# Patient Record
Sex: FEMALE | Race: WHITE | NOT HISPANIC OR LATINO | Employment: OTHER | URBAN - METROPOLITAN AREA
[De-identification: names, ages, dates, MRNs, and addresses within clinical notes are randomized per-mention and may not be internally consistent; named-entity substitution may affect disease eponyms.]

---

## 2017-01-01 ENCOUNTER — HOSPITAL ENCOUNTER (INPATIENT)
Facility: OTHER | Age: 82
LOS: 5 days | DRG: 291 | End: 2017-01-20
Attending: EMERGENCY MEDICINE | Admitting: EMERGENCY MEDICINE
Payer: MEDICARE

## 2017-01-01 VITALS
BODY MASS INDEX: 22.08 KG/M2 | WEIGHT: 120 LBS | HEIGHT: 62 IN | RESPIRATION RATE: 18 BRPM | SYSTOLIC BLOOD PRESSURE: 106 MMHG | TEMPERATURE: 98 F | DIASTOLIC BLOOD PRESSURE: 53 MMHG | HEART RATE: 64 BPM | OXYGEN SATURATION: 98 %

## 2017-01-01 DIAGNOSIS — R06.02 SHORTNESS OF BREATH: Primary | ICD-10-CM

## 2017-01-01 DIAGNOSIS — I50.32 HEART FAILURE, DIASTOLIC, CHRONIC: ICD-10-CM

## 2017-01-01 DIAGNOSIS — N17.9 ACUTE RENAL FAILURE, UNSPECIFIED ACUTE RENAL FAILURE TYPE: ICD-10-CM

## 2017-01-01 LAB
ALBUMIN SERPL BCP-MCNC: 3.7 G/DL
ALP SERPL-CCNC: 61 U/L
ALT SERPL W/O P-5'-P-CCNC: 62 U/L
ANION GAP SERPL CALC-SCNC: 12 MMOL/L
ANION GAP SERPL CALC-SCNC: 12 MMOL/L
ANION GAP SERPL CALC-SCNC: 14 MMOL/L
ANION GAP SERPL CALC-SCNC: 15 MMOL/L
ANION GAP SERPL CALC-SCNC: 16 MMOL/L
ANION GAP SERPL CALC-SCNC: 18 MMOL/L
APTT BLDCRRT: 28.8 SEC
AST SERPL-CCNC: 83 U/L
BASOPHILS # BLD AUTO: 0.02 K/UL
BASOPHILS # BLD AUTO: 0.03 K/UL
BASOPHILS # BLD AUTO: 0.05 K/UL
BASOPHILS # BLD AUTO: 0.05 K/UL
BASOPHILS NFR BLD: 0.3 %
BASOPHILS NFR BLD: 0.4 %
BASOPHILS NFR BLD: 0.4 %
BASOPHILS NFR BLD: 0.5 %
BASOPHILS NFR BLD: 0.6 %
BASOPHILS NFR BLD: 0.6 %
BILIRUB SERPL-MCNC: 1 MG/DL
BILIRUB UR QL STRIP: NEGATIVE
BNP SERPL-MCNC: 3061 PG/ML
BNP SERPL-MCNC: 3232 PG/ML
BNP SERPL-MCNC: 3654 PG/ML
BUN SERPL-MCNC: 75 MG/DL
BUN SERPL-MCNC: 75 MG/DL
BUN SERPL-MCNC: 78 MG/DL
BUN SERPL-MCNC: 80 MG/DL
BUN SERPL-MCNC: 81 MG/DL
BUN SERPL-MCNC: 81 MG/DL
CALCIUM SERPL-MCNC: 8.6 MG/DL
CALCIUM SERPL-MCNC: 8.7 MG/DL
CALCIUM SERPL-MCNC: 8.7 MG/DL
CALCIUM SERPL-MCNC: 8.8 MG/DL
CALCIUM SERPL-MCNC: 8.8 MG/DL
CALCIUM SERPL-MCNC: 9 MG/DL
CHLORIDE SERPL-SCNC: 100 MMOL/L
CHLORIDE SERPL-SCNC: 101 MMOL/L
CHLORIDE SERPL-SCNC: 104 MMOL/L
CHLORIDE SERPL-SCNC: 104 MMOL/L
CHLORIDE SERPL-SCNC: 105 MMOL/L
CHLORIDE SERPL-SCNC: 105 MMOL/L
CK SERPL-CCNC: 109 U/L
CLARITY UR: CLEAR
CO2 SERPL-SCNC: 13 MMOL/L
CO2 SERPL-SCNC: 15 MMOL/L
CO2 SERPL-SCNC: 17 MMOL/L
COLOR UR: YELLOW
CREAT SERPL-MCNC: 2.3 MG/DL
CREAT SERPL-MCNC: 2.3 MG/DL
CREAT SERPL-MCNC: 2.5 MG/DL
CREAT SERPL-MCNC: 2.6 MG/DL
CREAT SERPL-MCNC: 2.8 MG/DL
CREAT SERPL-MCNC: 2.9 MG/DL
CREAT UR-MCNC: 70.3 MG/DL
DIFFERENTIAL METHOD: ABNORMAL
EOSINOPHIL # BLD AUTO: 0.1 K/UL
EOSINOPHIL # BLD AUTO: 0.2 K/UL
EOSINOPHIL # BLD AUTO: 0.3 K/UL
EOSINOPHIL # BLD AUTO: 0.3 K/UL
EOSINOPHIL NFR BLD: 0.7 %
EOSINOPHIL NFR BLD: 0.7 %
EOSINOPHIL NFR BLD: 1.8 %
EOSINOPHIL NFR BLD: 2.7 %
EOSINOPHIL NFR BLD: 3.1 %
EOSINOPHIL NFR BLD: 3.1 %
EOSINOPHIL URNS QL WRIGHT STN: NORMAL
ERYTHROCYTE [DISTWIDTH] IN BLOOD BY AUTOMATED COUNT: 17.7 %
ERYTHROCYTE [DISTWIDTH] IN BLOOD BY AUTOMATED COUNT: 17.7 %
ERYTHROCYTE [DISTWIDTH] IN BLOOD BY AUTOMATED COUNT: 17.9 %
ERYTHROCYTE [DISTWIDTH] IN BLOOD BY AUTOMATED COUNT: 18.1 %
ERYTHROCYTE [DISTWIDTH] IN BLOOD BY AUTOMATED COUNT: 18.4 %
ERYTHROCYTE [DISTWIDTH] IN BLOOD BY AUTOMATED COUNT: 18.4 %
EST. GFR  (AFRICAN AMERICAN): 17 ML/MIN/1.73 M^2
EST. GFR  (AFRICAN AMERICAN): 17 ML/MIN/1.73 M^2
EST. GFR  (AFRICAN AMERICAN): 19 ML/MIN/1.73 M^2
EST. GFR  (AFRICAN AMERICAN): 20 ML/MIN/1.73 M^2
EST. GFR  (AFRICAN AMERICAN): 22 ML/MIN/1.73 M^2
EST. GFR  (AFRICAN AMERICAN): 22 ML/MIN/1.73 M^2
EST. GFR  (NON AFRICAN AMERICAN): 14 ML/MIN/1.73 M^2
EST. GFR  (NON AFRICAN AMERICAN): 15 ML/MIN/1.73 M^2
EST. GFR  (NON AFRICAN AMERICAN): 16 ML/MIN/1.73 M^2
EST. GFR  (NON AFRICAN AMERICAN): 17 ML/MIN/1.73 M^2
EST. GFR  (NON AFRICAN AMERICAN): 19 ML/MIN/1.73 M^2
EST. GFR  (NON AFRICAN AMERICAN): 19 ML/MIN/1.73 M^2
GLUCOSE SERPL-MCNC: 121 MG/DL
GLUCOSE SERPL-MCNC: 154 MG/DL
GLUCOSE SERPL-MCNC: 165 MG/DL
GLUCOSE SERPL-MCNC: 166 MG/DL
GLUCOSE SERPL-MCNC: 166 MG/DL
GLUCOSE SERPL-MCNC: 183 MG/DL
GLUCOSE UR QL STRIP: NEGATIVE
HCT VFR BLD AUTO: 34.4 %
HCT VFR BLD AUTO: 34.6 %
HCT VFR BLD AUTO: 35.9 %
HCT VFR BLD AUTO: 38 %
HGB BLD-MCNC: 11.6 G/DL
HGB BLD-MCNC: 12.2 G/DL
HGB BLD-MCNC: 12.8 G/DL
HGB UR QL STRIP: NEGATIVE
INR PPP: 1.2
KETONES UR QL STRIP: NEGATIVE
LACTATE SERPL-SCNC: 1.3 MMOL/L
LEUKOCYTE ESTERASE UR QL STRIP: NEGATIVE
LYMPHOCYTES # BLD AUTO: 0.6 K/UL
LYMPHOCYTES # BLD AUTO: 0.6 K/UL
LYMPHOCYTES # BLD AUTO: 0.7 K/UL
LYMPHOCYTES # BLD AUTO: 0.8 K/UL
LYMPHOCYTES # BLD AUTO: 0.8 K/UL
LYMPHOCYTES # BLD AUTO: 1.4 K/UL
LYMPHOCYTES NFR BLD: 11.1 %
LYMPHOCYTES NFR BLD: 12.4 %
LYMPHOCYTES NFR BLD: 15.9 %
LYMPHOCYTES NFR BLD: 7.7 %
LYMPHOCYTES NFR BLD: 7.7 %
LYMPHOCYTES NFR BLD: 9.9 %
MAGNESIUM SERPL-MCNC: 2.2 MG/DL
MCH RBC QN AUTO: 32.6 PG
MCH RBC QN AUTO: 32.7 PG
MCH RBC QN AUTO: 32.8 PG
MCH RBC QN AUTO: 32.8 PG
MCH RBC QN AUTO: 33 PG
MCH RBC QN AUTO: 33 PG
MCHC RBC AUTO-ENTMCNC: 33.5 %
MCHC RBC AUTO-ENTMCNC: 33.7 %
MCHC RBC AUTO-ENTMCNC: 33.7 %
MCHC RBC AUTO-ENTMCNC: 34 %
MCV RBC AUTO: 97 FL
MCV RBC AUTO: 98 FL
MCV RBC AUTO: 98 FL
MONOCYTES # BLD AUTO: 0.7 K/UL
MONOCYTES # BLD AUTO: 0.9 K/UL
MONOCYTES # BLD AUTO: 1.3 K/UL
MONOCYTES # BLD AUTO: 1.6 K/UL
MONOCYTES # BLD AUTO: 1.6 K/UL
MONOCYTES # BLD AUTO: 1.7 K/UL
MONOCYTES NFR BLD: 13.7 %
MONOCYTES NFR BLD: 19.2 %
MONOCYTES NFR BLD: 19.2 %
MONOCYTES NFR BLD: 20.3 %
MONOCYTES NFR BLD: 21.2 %
MONOCYTES NFR BLD: 7.6 %
NEUTROPHILS # BLD AUTO: 4 K/UL
NEUTROPHILS # BLD AUTO: 4.4 K/UL
NEUTROPHILS # BLD AUTO: 5.5 K/UL
NEUTROPHILS # BLD AUTO: 5.5 K/UL
NEUTROPHILS # BLD AUTO: 5.7 K/UL
NEUTROPHILS # BLD AUTO: 6.5 K/UL
NEUTROPHILS NFR BLD: 64 %
NEUTROPHILS NFR BLD: 67.3 %
NEUTROPHILS NFR BLD: 68.5 %
NEUTROPHILS NFR BLD: 68.5 %
NEUTROPHILS NFR BLD: 69.8 %
NEUTROPHILS NFR BLD: 73.3 %
NITRITE UR QL STRIP: NEGATIVE
PH UR STRIP: 6 [PH] (ref 5–8)
PLATELET # BLD AUTO: 143 K/UL
PLATELET # BLD AUTO: 143 K/UL
PLATELET # BLD AUTO: 144 K/UL
PLATELET # BLD AUTO: 147 K/UL
PLATELET # BLD AUTO: 153 K/UL
PLATELET # BLD AUTO: 185 K/UL
PMV BLD AUTO: 10.1 FL
PMV BLD AUTO: 10.2 FL
PMV BLD AUTO: 10.2 FL
PMV BLD AUTO: 10.4 FL
POTASSIUM SERPL-SCNC: 3.4 MMOL/L
POTASSIUM SERPL-SCNC: 3.4 MMOL/L
POTASSIUM SERPL-SCNC: 3.5 MMOL/L
POTASSIUM SERPL-SCNC: 4 MMOL/L
POTASSIUM SERPL-SCNC: 4 MMOL/L
POTASSIUM SERPL-SCNC: 4.6 MMOL/L
PROT SERPL-MCNC: 7.6 G/DL
PROT UR QL STRIP: NEGATIVE
PROTHROMBIN TIME: 13.8 SEC
RBC # BLD AUTO: 3.52 M/UL
RBC # BLD AUTO: 3.52 M/UL
RBC # BLD AUTO: 3.55 M/UL
RBC # BLD AUTO: 3.56 M/UL
RBC # BLD AUTO: 3.72 M/UL
RBC # BLD AUTO: 3.9 M/UL
SODIUM SERPL-SCNC: 131 MMOL/L
SODIUM SERPL-SCNC: 133 MMOL/L
SODIUM SERPL-SCNC: 134 MMOL/L
SODIUM SERPL-SCNC: 134 MMOL/L
SODIUM SERPL-SCNC: 135 MMOL/L
SODIUM SERPL-SCNC: 135 MMOL/L
SODIUM UR-SCNC: 30 MMOL/L
SP GR UR STRIP: 1.02 (ref 1–1.03)
TROPONIN I SERPL DL<=0.01 NG/ML-MCNC: 0.02 NG/ML
TROPONIN I SERPL DL<=0.01 NG/ML-MCNC: 0.02 NG/ML
TROPONIN I SERPL DL<=0.01 NG/ML-MCNC: 0.03 NG/ML
URATE SERPL-MCNC: 1.3 MG/DL
URN SPEC COLLECT METH UR: NORMAL
UROBILINOGEN UR STRIP-ACNC: NEGATIVE EU/DL
WBC # BLD AUTO: 6.22 K/UL
WBC # BLD AUTO: 6.36 K/UL
WBC # BLD AUTO: 8.07 K/UL
WBC # BLD AUTO: 8.07 K/UL
WBC # BLD AUTO: 8.41 K/UL
WBC # BLD AUTO: 8.83 K/UL

## 2017-01-01 PROCEDURE — 80053 COMPREHEN METABOLIC PANEL: CPT

## 2017-01-01 PROCEDURE — 27000221 HC OXYGEN, UP TO 24 HOURS

## 2017-01-01 PROCEDURE — 84300 ASSAY OF URINE SODIUM: CPT

## 2017-01-01 PROCEDURE — 97530 THERAPEUTIC ACTIVITIES: CPT

## 2017-01-01 PROCEDURE — 36415 COLL VENOUS BLD VENIPUNCTURE: CPT

## 2017-01-01 PROCEDURE — 63600175 PHARM REV CODE 636 W HCPCS: Performed by: EMERGENCY MEDICINE

## 2017-01-01 PROCEDURE — 96374 THER/PROPH/DIAG INJ IV PUSH: CPT

## 2017-01-01 PROCEDURE — 85730 THROMBOPLASTIN TIME PARTIAL: CPT

## 2017-01-01 PROCEDURE — 97535 SELF CARE MNGMENT TRAINING: CPT

## 2017-01-01 PROCEDURE — 93005 ELECTROCARDIOGRAM TRACING: CPT

## 2017-01-01 PROCEDURE — 11000001 HC ACUTE MED/SURG PRIVATE ROOM

## 2017-01-01 PROCEDURE — 25000003 PHARM REV CODE 250: Performed by: INTERNAL MEDICINE

## 2017-01-01 PROCEDURE — 80048 BASIC METABOLIC PNL TOTAL CA: CPT

## 2017-01-01 PROCEDURE — 25000003 PHARM REV CODE 250: Performed by: EMERGENCY MEDICINE

## 2017-01-01 PROCEDURE — 25000003 PHARM REV CODE 250: Performed by: NURSE PRACTITIONER

## 2017-01-01 PROCEDURE — 84484 ASSAY OF TROPONIN QUANT: CPT | Mod: 91

## 2017-01-01 PROCEDURE — 87205 SMEAR GRAM STAIN: CPT

## 2017-01-01 PROCEDURE — 81003 URINALYSIS AUTO W/O SCOPE: CPT

## 2017-01-01 PROCEDURE — 99900035 HC TECH TIME PER 15 MIN (STAT)

## 2017-01-01 PROCEDURE — 85025 COMPLETE CBC W/AUTO DIFF WBC: CPT

## 2017-01-01 PROCEDURE — 97112 NEUROMUSCULAR REEDUCATION: CPT

## 2017-01-01 PROCEDURE — 97116 GAIT TRAINING THERAPY: CPT

## 2017-01-01 PROCEDURE — 85610 PROTHROMBIN TIME: CPT

## 2017-01-01 PROCEDURE — 97162 PT EVAL MOD COMPLEX 30 MIN: CPT

## 2017-01-01 PROCEDURE — 94799 UNLISTED PULMONARY SVC/PX: CPT

## 2017-01-01 PROCEDURE — 83735 ASSAY OF MAGNESIUM: CPT

## 2017-01-01 PROCEDURE — 83880 ASSAY OF NATRIURETIC PEPTIDE: CPT

## 2017-01-01 PROCEDURE — 99285 EMERGENCY DEPT VISIT HI MDM: CPT | Mod: 25

## 2017-01-01 PROCEDURE — 93010 ELECTROCARDIOGRAM REPORT: CPT | Mod: ,,, | Performed by: INTERNAL MEDICINE

## 2017-01-01 PROCEDURE — 97166 OT EVAL MOD COMPLEX 45 MIN: CPT

## 2017-01-01 PROCEDURE — 84550 ASSAY OF BLOOD/URIC ACID: CPT

## 2017-01-01 PROCEDURE — 82550 ASSAY OF CK (CPK): CPT

## 2017-01-01 PROCEDURE — 82570 ASSAY OF URINE CREATININE: CPT

## 2017-01-01 PROCEDURE — 83605 ASSAY OF LACTIC ACID: CPT

## 2017-01-01 PROCEDURE — 96361 HYDRATE IV INFUSION ADD-ON: CPT

## 2017-01-01 RX ORDER — FUROSEMIDE 20 MG/1
20 TABLET ORAL DAILY
Status: DISCONTINUED | OUTPATIENT
Start: 2017-01-01 | End: 2017-01-01

## 2017-01-01 RX ORDER — SODIUM BICARBONATE 650 MG/1
650 TABLET ORAL 2 TIMES DAILY
Status: DISCONTINUED | OUTPATIENT
Start: 2017-01-01 | End: 2017-01-21 | Stop reason: HOSPADM

## 2017-01-01 RX ORDER — ALLOPURINOL 100 MG/1
100 TABLET ORAL ONCE
Status: COMPLETED | OUTPATIENT
Start: 2017-01-01 | End: 2017-01-01

## 2017-01-01 RX ORDER — ASPIRIN 81 MG/1
81 TABLET ORAL DAILY
Qty: 90 TABLET | Refills: 3 | COMMUNITY
Start: 2017-01-01 | End: 2018-01-19

## 2017-01-01 RX ORDER — HYDROCORTISONE 25 MG/G
CREAM TOPICAL 2 TIMES DAILY
Status: DISCONTINUED | OUTPATIENT
Start: 2017-01-01 | End: 2017-01-21 | Stop reason: HOSPADM

## 2017-01-01 RX ORDER — SODIUM BICARBONATE 325 MG/1
325 TABLET ORAL 3 TIMES DAILY
Status: DISCONTINUED | OUTPATIENT
Start: 2017-01-01 | End: 2017-01-01

## 2017-01-01 RX ORDER — FAMOTIDINE 20 MG/1
20 TABLET, FILM COATED ORAL 2 TIMES DAILY
Status: DISCONTINUED | OUTPATIENT
Start: 2017-01-01 | End: 2017-01-21 | Stop reason: HOSPADM

## 2017-01-01 RX ORDER — CITALOPRAM 20 MG/1
40 TABLET, FILM COATED ORAL ONCE
Status: COMPLETED | OUTPATIENT
Start: 2017-01-01 | End: 2017-01-01

## 2017-01-01 RX ORDER — HEPARIN SODIUM 5000 [USP'U]/ML
5000 INJECTION, SOLUTION INTRAVENOUS; SUBCUTANEOUS EVERY 12 HOURS
Status: DISCONTINUED | OUTPATIENT
Start: 2017-01-01 | End: 2017-01-01

## 2017-01-01 RX ORDER — DIVALPROEX SODIUM 250 MG/1
250 TABLET, DELAYED RELEASE ORAL 2 TIMES DAILY
Status: DISCONTINUED | OUTPATIENT
Start: 2017-01-01 | End: 2017-01-21 | Stop reason: HOSPADM

## 2017-01-01 RX ORDER — ASPIRIN 81 MG/1
81 TABLET ORAL DAILY
Status: DISCONTINUED | OUTPATIENT
Start: 2017-01-01 | End: 2017-01-21 | Stop reason: HOSPADM

## 2017-01-01 RX ORDER — FUROSEMIDE 20 MG/1
20 TABLET ORAL DAILY
Qty: 90 TABLET | Refills: 3 | Status: SHIPPED | OUTPATIENT
Start: 2017-01-01 | End: 2018-01-19

## 2017-01-01 RX ORDER — MORPHINE SULFATE 2 MG/ML
2 INJECTION, SOLUTION INTRAMUSCULAR; INTRAVENOUS EVERY 4 HOURS PRN
Status: DISCONTINUED | OUTPATIENT
Start: 2017-01-01 | End: 2017-01-01

## 2017-01-01 RX ORDER — CHOLECALCIFEROL (VITAMIN D3) 25 MCG
185 TABLET ORAL DAILY
COMMUNITY

## 2017-01-01 RX ORDER — NAPROXEN SODIUM 220 MG/1
81 TABLET, FILM COATED ORAL DAILY
Status: DISCONTINUED | OUTPATIENT
Start: 2017-01-01 | End: 2017-01-01

## 2017-01-01 RX ORDER — CHOLECALCIFEROL (VITAMIN D3) 25 MCG
1000 TABLET ORAL DAILY
Status: DISCONTINUED | OUTPATIENT
Start: 2017-01-01 | End: 2017-01-21 | Stop reason: HOSPADM

## 2017-01-01 RX ORDER — ACETAMINOPHEN 325 MG/1
650 TABLET ORAL EVERY 6 HOURS PRN
Status: DISCONTINUED | OUTPATIENT
Start: 2017-01-01 | End: 2017-01-21 | Stop reason: HOSPADM

## 2017-01-01 RX ORDER — METOPROLOL TARTRATE 50 MG/1
100 TABLET ORAL
Status: ACTIVE | OUTPATIENT
Start: 2017-01-01 | End: 2017-01-01

## 2017-01-01 RX ORDER — METOPROLOL SUCCINATE 50 MG/1
100 TABLET, EXTENDED RELEASE ORAL DAILY
Status: DISCONTINUED | OUTPATIENT
Start: 2017-01-01 | End: 2017-01-21 | Stop reason: HOSPADM

## 2017-01-01 RX ORDER — AMOXICILLIN 250 MG
1 CAPSULE ORAL 2 TIMES DAILY
Status: DISCONTINUED | OUTPATIENT
Start: 2017-01-01 | End: 2017-01-21 | Stop reason: HOSPADM

## 2017-01-01 RX ORDER — CITALOPRAM 20 MG/1
20 TABLET, FILM COATED ORAL 2 TIMES DAILY
Status: DISCONTINUED | OUTPATIENT
Start: 2017-01-01 | End: 2017-01-21 | Stop reason: HOSPADM

## 2017-01-01 RX ORDER — OFLOXACIN 3 MG/ML
5 SOLUTION/ DROPS OPHTHALMIC 3 TIMES DAILY
Status: DISCONTINUED | OUTPATIENT
Start: 2017-01-01 | End: 2017-01-21 | Stop reason: HOSPADM

## 2017-01-01 RX ORDER — FUROSEMIDE 10 MG/ML
20 INJECTION INTRAMUSCULAR; INTRAVENOUS
Status: COMPLETED | OUTPATIENT
Start: 2017-01-01 | End: 2017-01-01

## 2017-01-01 RX ORDER — ALLOPURINOL 100 MG/1
100 TABLET ORAL DAILY
Status: DISCONTINUED | OUTPATIENT
Start: 2017-01-01 | End: 2017-01-21 | Stop reason: HOSPADM

## 2017-01-01 RX ORDER — POTASSIUM CHLORIDE 20 MEQ/15ML
20 SOLUTION ORAL ONCE
Status: COMPLETED | OUTPATIENT
Start: 2017-01-01 | End: 2017-01-01

## 2017-01-01 RX ORDER — POTASSIUM CHLORIDE 20 MEQ/1
20 TABLET, EXTENDED RELEASE ORAL ONCE
Status: COMPLETED | OUTPATIENT
Start: 2017-01-01 | End: 2017-01-01

## 2017-01-01 RX ORDER — ONDANSETRON 2 MG/ML
4 INJECTION INTRAMUSCULAR; INTRAVENOUS EVERY 8 HOURS PRN
Status: DISCONTINUED | OUTPATIENT
Start: 2017-01-01 | End: 2017-01-21 | Stop reason: HOSPADM

## 2017-01-01 RX ADMIN — METOPROLOL SUCCINATE 100 MG: 50 TABLET, EXTENDED RELEASE ORAL at 11:01

## 2017-01-01 RX ADMIN — SODIUM BICARBONATE 650 MG TABLET 650 MG: at 10:01

## 2017-01-01 RX ADMIN — SITAGLIPTIN 25 MG: 25 TABLET, FILM COATED ORAL at 01:01

## 2017-01-01 RX ADMIN — CITALOPRAM HYDROBROMIDE 20 MG: 20 TABLET ORAL at 09:01

## 2017-01-01 RX ADMIN — HEPARIN SODIUM 5000 UNITS: 5000 INJECTION, SOLUTION INTRAVENOUS; SUBCUTANEOUS at 10:01

## 2017-01-01 RX ADMIN — CITALOPRAM HYDROBROMIDE 20 MG: 20 TABLET ORAL at 10:01

## 2017-01-01 RX ADMIN — DIVALPROEX SODIUM 250 MG: 250 TABLET, DELAYED RELEASE ORAL at 10:01

## 2017-01-01 RX ADMIN — OFLOXACIN 5 DROP: 3 SOLUTION/ DROPS OPHTHALMIC at 09:01

## 2017-01-01 RX ADMIN — OFLOXACIN 5 DROP: 3 SOLUTION/ DROPS OPHTHALMIC at 02:01

## 2017-01-01 RX ADMIN — ALLOPURINOL 100 MG: 100 TABLET ORAL at 10:01

## 2017-01-01 RX ADMIN — METOPROLOL SUCCINATE 100 MG: 50 TABLET, EXTENDED RELEASE ORAL at 10:01

## 2017-01-01 RX ADMIN — ALLOPURINOL 100 MG: 100 TABLET ORAL at 09:01

## 2017-01-01 RX ADMIN — OFLOXACIN 5 DROP: 3 SOLUTION/ DROPS OPHTHALMIC at 06:01

## 2017-01-01 RX ADMIN — CITALOPRAM HYDROBROMIDE 20 MG: 20 TABLET ORAL at 11:01

## 2017-01-01 RX ADMIN — FAMOTIDINE 20 MG: 20 TABLET, FILM COATED ORAL at 09:01

## 2017-01-01 RX ADMIN — FUROSEMIDE 20 MG: 10 INJECTION, SOLUTION INTRAMUSCULAR; INTRAVENOUS at 02:01

## 2017-01-01 RX ADMIN — VITAMIN D, TAB 1000IU (100/BT) 1000 UNITS: 25 TAB at 09:01

## 2017-01-01 RX ADMIN — SODIUM BICARBONATE TAB 325 MG 325 MG: 325 TAB at 09:01

## 2017-01-01 RX ADMIN — SODIUM BICARBONATE 650 MG TABLET 650 MG: at 08:01

## 2017-01-01 RX ADMIN — OFLOXACIN 5 DROP: 3 SOLUTION/ DROPS OPHTHALMIC at 05:01

## 2017-01-01 RX ADMIN — FAMOTIDINE 20 MG: 20 TABLET, FILM COATED ORAL at 08:01

## 2017-01-01 RX ADMIN — STANDARDIZED SENNA CONCENTRATE AND DOCUSATE SODIUM 1 TABLET: 8.6; 5 TABLET, FILM COATED ORAL at 09:01

## 2017-01-01 RX ADMIN — SODIUM BICARBONATE TAB 325 MG 325 MG: 325 TAB at 02:01

## 2017-01-01 RX ADMIN — FAMOTIDINE 20 MG: 20 TABLET, FILM COATED ORAL at 11:01

## 2017-01-01 RX ADMIN — DIVALPROEX SODIUM 250 MG: 250 TABLET, DELAYED RELEASE ORAL at 09:01

## 2017-01-01 RX ADMIN — CITALOPRAM HYDROBROMIDE 20 MG: 20 TABLET ORAL at 08:01

## 2017-01-01 RX ADMIN — STANDARDIZED SENNA CONCENTRATE AND DOCUSATE SODIUM 1 TABLET: 8.6; 5 TABLET, FILM COATED ORAL at 08:01

## 2017-01-01 RX ADMIN — APIXABAN 2.5 MG: 2.5 TABLET, FILM COATED ORAL at 09:01

## 2017-01-01 RX ADMIN — FAMOTIDINE 20 MG: 20 TABLET, FILM COATED ORAL at 10:01

## 2017-01-01 RX ADMIN — ASPIRIN 81 MG: 81 TABLET, COATED ORAL at 11:01

## 2017-01-01 RX ADMIN — SITAGLIPTIN 100 MG: 100 TABLET, FILM COATED ORAL at 09:01

## 2017-01-01 RX ADMIN — FUROSEMIDE 20 MG: 20 TABLET ORAL at 09:01

## 2017-01-01 RX ADMIN — ASPIRIN 81 MG 81 MG: 81 TABLET ORAL at 10:01

## 2017-01-01 RX ADMIN — STANDARDIZED SENNA CONCENTRATE AND DOCUSATE SODIUM 1 TABLET: 8.6; 5 TABLET, FILM COATED ORAL at 10:01

## 2017-01-01 RX ADMIN — SODIUM BICARBONATE TAB 325 MG 325 MG: 325 TAB at 05:01

## 2017-01-01 RX ADMIN — VITAMIN D, TAB 1000IU (100/BT) 1000 UNITS: 25 TAB at 10:01

## 2017-01-01 RX ADMIN — OFLOXACIN 5 DROP: 3 SOLUTION/ DROPS OPHTHALMIC at 10:01

## 2017-01-01 RX ADMIN — DIVALPROEX SODIUM 250 MG: 250 TABLET, DELAYED RELEASE ORAL at 08:01

## 2017-01-01 RX ADMIN — MORPHINE SULFATE 2 MG: 2 INJECTION, SOLUTION INTRAMUSCULAR; INTRAVENOUS at 01:01

## 2017-01-01 RX ADMIN — CITALOPRAM HYDROBROMIDE 40 MG: 20 TABLET ORAL at 03:01

## 2017-01-01 RX ADMIN — ASPIRIN 81 MG: 81 TABLET, COATED ORAL at 12:01

## 2017-01-01 RX ADMIN — HEPARIN SODIUM 5000 UNITS: 5000 INJECTION, SOLUTION INTRAVENOUS; SUBCUTANEOUS at 08:01

## 2017-01-01 RX ADMIN — SODIUM BICARBONATE TAB 325 MG 325 MG: 325 TAB at 06:01

## 2017-01-01 RX ADMIN — ALLOPURINOL 100 MG: 100 TABLET ORAL at 11:01

## 2017-01-01 RX ADMIN — DIVALPROEX SODIUM 250 MG: 250 TABLET, DELAYED RELEASE ORAL at 11:01

## 2017-01-01 RX ADMIN — HEPARIN SODIUM 5000 UNITS: 5000 INJECTION, SOLUTION INTRAVENOUS; SUBCUTANEOUS at 09:01

## 2017-01-01 RX ADMIN — POTASSIUM CHLORIDE 20 MEQ: 40 SOLUTION ORAL at 10:01

## 2017-01-01 RX ADMIN — ALLOPURINOL 100 MG: 100 TABLET ORAL at 03:01

## 2017-01-01 RX ADMIN — SODIUM BICARBONATE TAB 325 MG 325 MG: 325 TAB at 10:01

## 2017-01-01 RX ADMIN — SODIUM BICARBONATE 650 MG TABLET 650 MG: at 09:01

## 2017-01-01 RX ADMIN — OFLOXACIN 5 DROP: 3 SOLUTION/ DROPS OPHTHALMIC at 01:01

## 2017-01-01 RX ADMIN — SITAGLIPTIN 25 MG: 25 TABLET, FILM COATED ORAL at 12:01

## 2017-01-01 RX ADMIN — METOPROLOL SUCCINATE 100 MG: 50 TABLET, EXTENDED RELEASE ORAL at 09:01

## 2017-01-01 RX ADMIN — SODIUM CHLORIDE 250 ML: 0.9 INJECTION, SOLUTION INTRAVENOUS at 12:01

## 2017-01-01 RX ADMIN — SITAGLIPTIN 25 MG: 25 TABLET, FILM COATED ORAL at 09:01

## 2017-01-15 NOTE — ED PROVIDER NOTES
Encounter Date: 1/15/2017    SCRIBE #1 NOTE: I, Rod Hooks, am scribing for, and in the presence of, Dr. Urbina.       History     Chief Complaint   Patient presents with    Shortness of Breath     daughter reports shortness of breath and altered mental status      Review of patient's allergies indicates:   Allergen Reactions    Bactrim [sulfamethoxazole-trimethoprim]     Sulfa (sulfonamide antibiotics)      HPI Comments: Time seen by provider: 11:58 AM    This is a 83 y.o. female who presents to the ED with a chief complaint of acute on chronic SOB. The patient reports that she has felt increasingly SOB over the past two days, with it worse today. She states that it is constant at both rest and exertion. Her daughter reports that she is on 2L of home O2 and has baseline SOB. The daughter feels that she has been more SOB than usual over the past couple of weeks. She states that she has fallen 5 times in the last two weeks according to her daughter, with bruising to the right side of her face. She denies any vomiting or diarrhea. She states that she normally ambulates with a walker for assistance, but has not been able to recently. She reports no pain or syncope. Her daughter notes increased swelling to her ankles. She states that she has a hx of dementia and does not remember some of her falls. She is on Eliquis. The patient lives with her . Hx of DM, HTN, a fib, CHF, COPD, CAD, stroke, CKD, and pneumonia noted. Her daughter states that Dr. Contreras is her nephrologist, Ezequiel Caro is her cardiologist, and Dr. Villafuerte is her PCP. Allergies to sulfa drugs reported.     The history is provided by the patient and a relative (daughter).     Past Medical History   Diagnosis Date    Anticoagulant long-term use     Atrial fibrillation     CHF (congestive heart failure)     CKD (chronic kidney disease), stage III     COPD (chronic obstructive pulmonary disease)     Coronary artery disease     Diabetes mellitus      Encounter for blood transfusion     Hypertension     Hypoxemia 11/2/2016 11/1/2016: Diagnosed. O2 Sat: Rest 92%. Ambulation 87%.    Osteoarthritis     Pneumonia     Stroke      2008    Vitamin D deficiency      Past Medical History Pertinent Negatives   Diagnosis Date Noted    Asthma 11/20/2015    Cancer 11/20/2015    Seizures 11/20/2015    Thyroid disease 11/20/2015    Transfusion reaction 11/20/2015     Past Surgical History   Procedure Laterality Date    Rotator cuff repair      Hysterectomy      Total knee arthroplasty      Appendectomy      Eye surgery       cataracts removed from both eyes    Joint replacement       knee replacements     Family History   Problem Relation Age of Onset    Arthritis Mother     Diabetes Mother     Hearing loss Mother     Arthritis Father      Social History   Substance Use Topics    Smoking status: Former Smoker     Types: Cigarettes    Smokeless tobacco: Never Used    Alcohol use No     Review of Systems   Constitutional: Negative for fever.   HENT: Negative for sore throat.    Respiratory: Positive for shortness of breath.    Cardiovascular: Positive for leg swelling (ankles per daughter). Negative for chest pain.   Gastrointestinal: Negative for diarrhea, nausea and vomiting.   Genitourinary: Negative for dysuria.   Musculoskeletal: Negative for back pain.   Skin: Negative for rash.        Positive for right sided facial bruising.   Neurological: Negative for syncope and weakness.   Hematological: Does not bruise/bleed easily.       Physical Exam   Initial Vitals   BP Pulse Resp Temp SpO2   01/15/17 1150 01/15/17 1150 01/15/17 1150 01/15/17 1153 01/15/17 1150   93/62 105 20 97.8 °F (36.6 °C) 87 %     Physical Exam    Nursing note and vitals reviewed.  Constitutional: She appears well-developed and well-nourished. She is not diaphoretic. No distress.   HENT:   Head: Normocephalic.   Right Ear: External ear normal.   Left Ear: External ear normal.    Nose: Nose normal.   Mouth/Throat: Oropharynx is clear and moist.   Extensive ecchymosis to the right side of her face.    Eyes: Conjunctivae and EOM are normal. Pupils are equal, round, and reactive to light. Right eye exhibits no discharge. Left eye exhibits no discharge.   Neck: Normal range of motion.   Cardiovascular: Normal heart sounds. Exam reveals no gallop and no friction rub.    No murmur heard.  Irregularly irregular.    Pulmonary/Chest: Breath sounds normal. No respiratory distress. She has no wheezes. She has no rhonchi. She has no rales.   Abdominal: Soft. There is no tenderness. There is no rebound and no guarding.   Musculoskeletal: Normal range of motion. She exhibits no tenderness.   Trace pitting edema to the bilateral lower extremities. 2+ DP/PT pulses bilaterally.    Neurological: She is alert and oriented to person, place, and time.   Skin: Skin is warm and dry. No rash and no abscess noted. No erythema. No pallor.   Psychiatric: She has a normal mood and affect. Her behavior is normal. Judgment and thought content normal.         ED Course   Procedures  Labs Reviewed   COMPREHENSIVE METABOLIC PANEL - Abnormal; Notable for the following:        Result Value    Sodium 131 (*)     CO2 13 (*)     Glucose 154 (*)     BUN, Bld 81 (*)     Creatinine 2.9 (*)     AST 83 (*)     ALT 62 (*)     Anion Gap 18 (*)     eGFR if  17 (*)     eGFR if non  14 (*)     All other components within normal limits   CBC W/ AUTO DIFFERENTIAL - Abnormal; Notable for the following:     RBC 3.90 (*)     MCH 32.8 (*)     RDW 17.7 (*)     Gran% 73.3 (*)     Lymph% 15.9 (*)     All other components within normal limits   B-TYPE NATRIURETIC PEPTIDE - Abnormal; Notable for the following:     BNP 3061 (*)     All other components within normal limits   TROPONIN I - Abnormal; Notable for the following:     Troponin I 0.030 (*)     All other components within normal limits   PROTIME-INR -  Abnormal; Notable for the following:     Prothrombin Time 13.8 (*)     All other components within normal limits   URINALYSIS   APTT   PROTIME-INR   APTT     EKG Readings: (Independently Interpreted)   12:05: Irregularly irregular rhythm. Rate of 105. Atrial fibrillation. Prolonged QRS at 162 ms. Lateral Q waves. Unchanged from previous EKG from October of 2016.      Imaging Results         CT Cervical Spine Without Contrast (Final result) Result time:  01/15/17 13:42:21    Final result by Shakila Arango MD (01/15/17 13:42:21)    Impression:      No acute fracture.       Electronically signed by: SHAKILA ARANGO MD  Date:     01/15/17  Time:    13:42     Narrative:    CT CERVICAL SPINE WITHOUT CONTRAST  CLINICAL INDICATION:  fall.    TECHNIQUE: 2mm axial images were acquired of the cervical spine without the administration of contrast. Coronal and sagittal reconstructions were performed.     COMPARISON: None      FINDINGS:    Spinal alignment is within normal limits. The vertebral body heights are adequately maintained. The intervertebral disk spaces are adequately maintained. There is no evidence of lytic or blastic lesions. There is no evidence of fracture or dislocation.      There is mild degenerative change of the lower cervical spine.    The remainder of the visualized osseous structures and soft tissues appear unremarkable.            CT Maxillofacial Without Contrast (Final result) Result time:  01/15/17 13:20:17    Final result by Shakila Arango MD (01/15/17 13:20:17)    Impression:     No acute fracture.      Electronically signed by: SHAKILA ARANGO MD  Date:     01/15/17  Time:    13:20     Narrative:      EXAM: Maxillofacial CT without contrast    CLINICAL INDICATION:  fall.    TECHNIQUE:  0.625  mm images were acquired through the paranasal sinuses from just above the frontal sinus down to just below the mandible without contrast were acquired.    COMPARISON:None.    FINDINGS: The frontal  sinuses, frontal ethmoid recesses, ethmoid sinuses, sphenoid sinuses, and maxillary sinuses are clear.  Ostiomeatal complexes are patent bilaterally.  The nasal bones are intact and the nasal septum is midline.  The orbits and orbital contents have normal appearance.    There is no evidence of acute fracture.            CT Head Without Contrast (Final result) Result time:  01/15/17 13:18:26    Final result by Shakila Arango MD (01/15/17 13:18:26)    Impression:     No significant change from previous exam with evidence of large remote right temporoparietal infarct and small left cerebellar remote infarct.      Electronically signed by: SHAKILA ARANGO MD  Date:     01/15/17  Time:    13:18     Narrative:      EXAM: Non-contrast head CT    CLINICAL INDICATION:  head injury.    TECHNIQUE: Routine cranial CT was acquired in the axial plane without contrast and coronal and sagittal reconstructions were performed.    COMPARISON:Prior from 12/16/13    FINDINGS: The brain parenchyma is of normal attenuation with no evidence of acute hemorrhage, mass lesion or new major vascular distribution infarct.  There is a large stable area of encephalomalacia in the right parietal temporal region and a smaller area in the left cerebellum consistent with remote infarcts. There are no extra-axial masses or fluid collections.  The ventricular system is of normal size for age and midline.     The visualized paranasal sinuses and mastoid air cells are clear.     There is no evidence of acute fracture.            X-Ray Chest 1 View (Final result) Result time:  01/15/17 12:48:06    Final result by Shakila Arango MD (01/15/17 12:48:06)    Impression:    Clear lungs.      Electronically signed by: SHAKILA ARANGO MD  Date:     01/15/17  Time:    12:48     Narrative:    EXAM:  AP CHEST RADIOGRAPH.     CLINICAL INDICATION:  Shortness of breath.    TECHNIQUE: Single AP view of the chest was obtained.     COMPARISON: Prior from  10/31/16    FINDINGS: The mediastinal structures are midline.  The cardiac silhouette is prominent but difficult to evaluate due to AP technique.  Is atherosclerotic calcification of the aortic arch. The lungs appear grossly clear.  No osseous abnormalities are identified.                X-Rays:   Independently Interpreted Readings:   Chest X-Ray: Trachea midline. Cardiomegaly. No effusion, infiltrate, or edema.     Medical Decision Making:   History:   Old Medical Records: I decided to obtain old medical records.  Old Records Summarized: records from clinic visits, records from previous admission(s) and other records.  Initial Assessment:   11:58AM:  Pt is a 82 y/o F who presents to ED with SOB along with generalized weakness, frequent falls at home.  Pt appears well, nontoxic though she is slightly tachycardic.  She does have chronic afib.  She does not seem significantly overloaded, though likely does not take much to cause an exacerbation.  Will plan for labs, cardiac eval, UA, will continue to follow and reassess.    Independently Interpreted Test(s):   I have ordered and independently interpreted X-rays - see prior notes.  I have ordered and independently interpreted EKG Reading(s) - see prior notes  Clinical Tests:   Lab Tests: Ordered and Reviewed  Radiological Study: Ordered and Reviewed  Medical Tests: Ordered and Reviewed  Other:   I have discussed this case with another health care provider.      2:59 PM:  Pt doing well, she remains stable at this time.  Her BNP and troponin are significantly elevated, concerning for acute on chronic heart failure.  She also has an increase in her Cr from 2.2 --> 2.9.  I discussed the patient's case with Dr. Hackett of cardiology.  Will plan to admit the patient under Dr. Nieves.      3:15PM:  I updated pt and family regarding her results, my conversation with Dr. Hackett, and plan for admission.  All questions answered.      5:07 PM:  I spoke with Dr. Vargas  regarding patient, he will consult on the patient.              Scribe Attestation:   Scribe #1: I performed the above scribed service and the documentation accurately describes the services I performed. I attest to the accuracy of the note.    Attending Attestation:           Physician Attestation for Scribe:  Physician Attestation Statement for Scribe #1: I, Dr. Urbina, reviewed documentation, as scribed by Rod Hooks in my presence, and it is both accurate and complete.                 ED Course     Clinical Impression:     1. Shortness of breath    2. Acute renal failure, unspecified acute renal failure type                Anna Urbina MD  01/15/17 8991

## 2017-01-15 NOTE — ED NOTES
Pt reports shortness of breath worsening over the last few days. Daughter reports pt w/ increased weakness and recent falls. Pt w/ yellow/purple ecchymosis noted to right side of face. Pt reports feeling short of breath, denies pain at present time. 2+ pitting edema noted to bilateral lower extremities. Pt AAO x 3, answers questions appropriately. Fall risk band applied per protocol, daughter at bedside

## 2017-01-16 NOTE — CONSULTS
Consult Note  Nephrology    Consult Requested By: Ethan Nieves MD  Reason for Consult: KIRILL/CKD    SUBJECTIVE:     History of Present Illness:  Patient is a 83 y.o. female presents with progressive worsening shortness of breath over the past few days and admitted for further evaluation.  Patient well known to our group and followed by Dr. Contreras with chronic kidney disease 3 and has a baseline creatinine of around 2.2-2.3.  Of note patient states that she fell in the bathroom about 2 weeks ago and had a right sided facial bruise.  All radiologic studies negative.    Evaluation emergency room revealed mild acute kidney injury likely due to congestive heart failure and suspect a degree of rhabdo.    Patient currently lying in bed in no acute distress states that she is feeling much better.  Denies chest pain, shortness of breath, nausea, vomiting, diarrhea, fever, chills.    Past Medical History   Diagnosis Date    Anticoagulant long-term use     Atrial fibrillation     CHF (congestive heart failure)     CKD (chronic kidney disease), stage III     COPD (chronic obstructive pulmonary disease)     Coronary artery disease     Diabetes mellitus     Encounter for blood transfusion     Hypertension     Hypoxemia 11/2/2016 11/1/2016: Diagnosed. O2 Sat: Rest 92%. Ambulation 87%.    Osteoarthritis     Pneumonia     Stroke      2008    Vitamin D deficiency      Past Surgical History   Procedure Laterality Date    Rotator cuff repair      Hysterectomy      Total knee arthroplasty      Appendectomy      Eye surgery       cataracts removed from both eyes    Joint replacement       knee replacements     Family History   Problem Relation Age of Onset    Arthritis Mother     Diabetes Mother     Hearing loss Mother     Arthritis Father      Social History   Substance Use Topics    Smoking status: Former Smoker     Types: Cigarettes    Smokeless tobacco: Never Used    Alcohol use No       Review of  patient's allergies indicates:   Allergen Reactions    Bactrim [sulfamethoxazole-trimethoprim]     Sulfa (sulfonamide antibiotics)         Review of Systems:    See history of present illness    OBJECTIVE:     Vital Signs (Most Recent)  Temp: 97.8 °F (36.6 °C) (01/16/17 0732)  Pulse: 98 (01/16/17 1000)  Resp: 16 (01/16/17 0732)  BP: 110/66 (01/16/17 0732)  SpO2: 96 % (01/16/17 0732)    Vital Signs Range (Last 24H):  Temp:  [97.4 °F (36.3 °C)-98.1 °F (36.7 °C)]   Pulse:  []   Resp:  [16-20]   BP: ()/(61-79)   SpO2:  [87 %-98 %]     Physical Exam:  General appearance: Elderly female in no acute distress  Eyes:  Conjunctivae/corneas clear. PERRL. right facial bruising noted  Lungs: Normal respiratory effort,  few bibasilar Rales  Heart: Regular/irregular rate and rhythm, S1, S2 normal, no murmur, rub or donis.  Abdomen: Soft, non-tender non-distended; bowel sounds normal; no masses,  no organomegaly  Extremities: No cyanosis or clubbing. 1+ edema.    Skin: Facial bruising noted  Neurologic: Normal strength and tone. No focal numbness or weakness       Laboratory:    Recent Labs  Lab 01/16/17  0445   WBC 6.22   RBC 3.56*   HGB 11.6*   HCT 34.6*   *   MCV 97   MCH 32.6*   MCHC 33.5     BMP:   Recent Labs  Lab 01/16/17  0445   *   *   K 3.5      CO2 15*   BUN 78*   CREATININE 2.6*   CALCIUM 8.6*     Lab Results   Component Value Date    CALCIUM 8.6 (L) 01/16/2017    PHOS 4.7 (H) 10/31/2016         Diagnostic Results:      ASSESSMENT/PLAN:     1. KIRILL on CKD IIIb-IV with baseline creatinine 2.2 likely secondary to decompensated heart failure and suspected rhabdo from recent fall (N 17.9, N 18.4): Check CPK, continue diuresis, and follow trends.  No Ace or ARB at this time.  We will check urine lytes and uric acid.Renally dose meds, avoid nephrotoxins, and monitor I/O's closely.  2. Shortness of breath likely from decompensated heart failure (I 50.32): Diuresis now and await further  recommendations from cardiology.  3. Non-gap metabolic acidosis- start po bicarbonate. Goal hco2 of greater than 22. If rhabdo, we may give IV but since we are trying to diurese, may not be able to tolerate.  4. Recent fall (W 19.XXX a):  CT neg.   5. Chronic metabolic acidosis (E 87.2): Continue oral bicarbonate and follow trends.        Thanks for consultation  See above orders and recommendations

## 2017-01-16 NOTE — PLAN OF CARE
Discharge Planning:    Chart reviewed    Plan of care discussed with patient, daughter, and sitter. Writer met with patient at bedside with sitter present. Patient confirmed identifying information. Patient stated she lives at home with her  and daughter and does have home health. Patient uses a rolling walker and wheel chair at home.     Writer also spoke with patient's daughter Yareli Valencia [phone] in regards to plan of care. Daughter wasn't able to recall name of HH agency, but writer found in the chart she is with [facility]. Daughter would like services to resume with [facility] upon d/c.     Case management to follow and remain supportive.        01/16/17 1047   Discharge Assessment   Assessment Type Discharge Planning Assessment   Confirmed/corrected address and phone number on facesheet? Yes   Assessment information obtained from? Patient;Caregiver   Prior to hospitilization cognitive status: Alert/Oriented   Prior to hospitalization functional status: Assistive Equipment;Needs Assistance   Current cognitive status: Alert/Oriented   Current Functional Status: Assistive Equipment;Needs Assistance   Arrived From admitted as an inpatient   Lives With child(nael), adult;spouse   Able to Return to Prior Arrangements yes   Is patient able to care for self after discharge? No   How many people do you have in your home that can help with your care after discharge? 2   Who are your caregiver(s) and their phone number(s)? Yareli Valencia, daughter, [phone]   Patient currently being followed by outpatient case management? No   Patient currently receives home health services? Yes   Patient previously received home health services and would like to resume services if necessary? Yes   If yes, name of home health provider: [facility]   Does the patient currently use HME? Yes   Patient currently receives private duty nursing? No   Patient currently receives any other outside agency  services? No   Equipment Currently Used at Home walker, rolling;wheelchair   Do you have any problems affording any of your prescribed medications? No   Is the patient taking medications as prescribed? yes   Do you have any financial concerns preventing you from receiving the healthcare you need? No   Does the patient have transportation to healthcare appointments? Yes   Transportation Available family or friend will provide   Discharge Plan A Home Health;Home with family   Patient/Family In Agreement With Plan yes

## 2017-01-17 NOTE — PLAN OF CARE
Problem: Patient Care Overview  Goal: Plan of Care Review  Outcome: Ongoing (interventions implemented as appropriate)  Resting comfortably in a low lying bed on 2 L of oxygen.  VSS.  Afebrile.  Very confused. Unable to be reoriented.  Incontinent.  Turned Q 2.  Meds must be given in pudding. No falls or injuries.  Safety precautions ongoing and call light within reach.  Will continue to monitor.

## 2017-01-17 NOTE — PLAN OF CARE
Problem: Patient Care Overview  Goal: Plan of Care Review  Outcome: Ongoing (interventions implemented as appropriate)  Good saturation on nasal O2.

## 2017-01-17 NOTE — NURSING
Pt  Remained sitting in chair in room No complaints noted. Status remained unchanged Private sitter  at bedside. Will continue to monitor.

## 2017-01-17 NOTE — PLAN OF CARE
Problem: Physical Therapy Goal  Goal: Physical Therapy Goal  Goals to be met by: 17     Patient will increase functional independence with mobility by performin. Supine to sit with SBA.   2. Sit to supine with SBA.   3. Sit<>stand transfer with min A.   4. Gait x 25 feet with Min A using rolling walker.   Outcome: Ongoing (interventions implemented as appropriate)  PT evaluation completed. Please see progress note for details, POC, and recommendations.

## 2017-01-17 NOTE — PLAN OF CARE
Problem: Patient Care Overview  Goal: Plan of Care Review  Outcome: Ongoing (interventions implemented as appropriate)  Pt remains on 2LNC. Pt confused and cannot answer if she takes home O2. Pt in no distress.

## 2017-01-17 NOTE — SUBJECTIVE & OBJECTIVE
Past Medical History   Diagnosis Date    Anticoagulant long-term use     Atrial fibrillation     CHF (congestive heart failure)     CKD (chronic kidney disease), stage III     COPD (chronic obstructive pulmonary disease)     Coronary artery disease     Diabetes mellitus     Encounter for blood transfusion     Hypertension     Hypoxemia 11/2/2016 11/1/2016: Diagnosed. O2 Sat: Rest 92%. Ambulation 87%.    Osteoarthritis     Pneumonia     Stroke      2008    Vitamin D deficiency        Past Surgical History   Procedure Laterality Date    Rotator cuff repair      Hysterectomy      Total knee arthroplasty      Appendectomy      Eye surgery       cataracts removed from both eyes    Joint replacement       knee replacements       Review of patient's allergies indicates:   Allergen Reactions    Bactrim [sulfamethoxazole-trimethoprim]     Sulfa (sulfonamide antibiotics)        No current facility-administered medications on file prior to encounter.      Current Outpatient Prescriptions on File Prior to Encounter   Medication Sig    allopurinol (ZYLOPRIM) 100 MG tablet Take 1 tablet (100 mg total) by mouth once daily.    citalopram (CELEXA) 20 MG tablet Take 20 mg by mouth 2 (two) times daily.     divalproex (DEPAKOTE) 250 MG EC tablet 2 (two) times daily.     ELIQUIS 2.5 mg Tab Take 1 tablet (2.5 mg total) by mouth 2 (two) times daily.    furosemide (LASIX) 40 MG tablet Take 1 tablet (40 mg total) by mouth once daily. (Patient taking differently: Take 20 mg by mouth once daily. )    metoprolol succinate (TOPROL-XL) 100 MG 24 hr tablet Take 1 tablet (100 mg total) by mouth once daily.    mirabegron 50 mg Tb24 Take 1 tablet (50 mg total) by mouth once daily.    ofloxacin (OCUFLOX) 0.3 % ophthalmic solution INSTILL 5 DROPS INTO LEFT EYE 3 TIMES A DAY FOR 10 DAYS    sodium bicarbonate 325 MG tablet Take 325 mg by mouth 3 (three) times daily.     cephALEXin (KEFLEX) 500 MG capsule TAKE 1  CAPSULE (500 MG TOTAL) BY MOUTH EVERY 8 (EIGHT) HOURS.     Family History     Problem Relation (Age of Onset)    Arthritis Mother, Father    Diabetes Mother    Hearing loss Mother        Social History Main Topics    Smoking status: Former Smoker     Types: Cigarettes    Smokeless tobacco: Never Used    Alcohol use No    Drug use: No    Sexual activity: No     Review of Systems   Constitutional: Positive for fatigue. Negative for fever.   HENT: Positive for facial swelling. Negative for rhinorrhea.         Extensive ecchymosis of right side of face.   Eyes: Negative for photophobia, discharge, redness, itching and visual disturbance.   Respiratory: Positive for shortness of breath. Negative for apnea, cough, choking, chest tightness, wheezing and stridor.    Cardiovascular: Negative for chest pain, palpitations and leg swelling.   Gastrointestinal: Negative for abdominal distention.   Endocrine: Negative for cold intolerance and heat intolerance.   Genitourinary: Negative for dysuria and hematuria.   Musculoskeletal: Positive for arthralgias and gait problem. Negative for joint swelling.   Skin: Negative for pallor and rash.   Neurological: Positive for weakness. Negative for dizziness, seizures, light-headedness, numbness and headaches.   Hematological: Negative for adenopathy.   Psychiatric/Behavioral: Negative for confusion and hallucinations.     Objective:     Vital Signs (Most Recent):  Temp: 98.4 °F (36.9 °C) (01/16/17 1511)  Pulse: (!) 121 (01/16/17 1800)  Resp: 16 (01/16/17 1511)  BP: 100/68 (01/16/17 1511)  SpO2: 98 % (01/16/17 1217) Vital Signs (24h Range):  Temp:  [97.6 °F (36.4 °C)-98.4 °F (36.9 °C)] 98.4 °F (36.9 °C)  Pulse:  [] 121  Resp:  [16-18] 16  SpO2:  [96 %-98 %] 98 %  BP: (100-121)/(66-79) 100/68     Weight: 54.4 kg (120 lb)  Body mass index is 21.95 kg/(m^2).    SpO2: 98 %  O2 Device (Oxygen Therapy): nasal cannula      Intake/Output Summary (Last 24 hours) at 01/16/17 1950  Last  data filed at 01/16/17 1504   Gross per 24 hour   Intake              700 ml   Output                1 ml   Net              699 ml       Lines/Drains/Airways     Peripheral Intravenous Line                 Peripheral IV - Single Lumen 01/15/17 1228 Left Antecubital 1 day                Physical Exam   Constitutional: She appears well-developed and well-nourished.  Non-toxic appearance. She does not appear ill. No distress.   HENT:   Head: Normocephalic. Head is with contusion (right side of face).   Mouth/Throat: No oropharyngeal exudate.   Eyes: Right eye exhibits no discharge. Left eye exhibits no discharge. Right conjunctiva is not injected. Left conjunctiva is not injected. Right pupil is round. Left pupil is round. Pupils are equal.   Neck: No JVD present. Carotid bruit is not present.   Cardiovascular: S1 normal and S2 normal.  An irregularly irregular rhythm present.   Murmur heard.   Systolic murmur is present   Pulses:       Radial pulses are 2+ on the right side, and 2+ on the left side.        Dorsalis pedis pulses are 1+ on the right side, and 1+ on the left side.        Posterior tibial pulses are 1+ on the right side, and 1+ on the left side.   Pulmonary/Chest: Effort normal. She has rhonchi in the right lower field. She has no rales.   Abdominal: Soft. Normal appearance. There is no tenderness.   Musculoskeletal:        Right ankle: She exhibits no swelling and no ecchymosis.        Left ankle: She exhibits no swelling and no ecchymosis.   Lymphadenopathy:        Head (right side): No submandibular adenopathy present.        Head (left side): No submandibular adenopathy present.     She has no cervical adenopathy.   Skin: Skin is warm and dry. No rash noted. She is not diaphoretic.   Psychiatric: She has a normal mood and affect. Her speech is normal and behavior is normal. Thought content normal. Cognition and memory are impaired.       Current Medications:     allopurinol  100 mg Oral Daily     apixaban  2.5 mg Oral BID    citalopram  20 mg Oral BID    divalproex  250 mg Oral BID    famotidine  20 mg Oral BID    furosemide  20 mg Oral Daily    metoprolol succinate  100 mg Oral Daily    ofloxacin  5 drop Left Eye TID    senna-docusate 8.6-50 mg  1 tablet Oral BID    [START ON 1/17/2017] SITagliptan  25 mg Oral Daily    sodium bicarbonate  325 mg Oral TID    vitamin D  1,000 Units Oral Daily     Current Laboratory Results:    Recent Results (from the past 24 hour(s))   Basic metabolic panel    Collection Time: 01/16/17  4:45 AM   Result Value Ref Range    Sodium 135 (L) 136 - 145 mmol/L    Potassium 3.5 3.5 - 5.1 mmol/L    Chloride 104 95 - 110 mmol/L    CO2 15 (L) 23 - 29 mmol/L    Glucose 121 (H) 70 - 110 mg/dL    BUN, Bld 78 (H) 8 - 23 mg/dL    Creatinine 2.6 (H) 0.5 - 1.4 mg/dL    Calcium 8.6 (L) 8.7 - 10.5 mg/dL    Anion Gap 16 8 - 16 mmol/L    eGFR if African American 19 (A) >60 mL/min/1.73 m^2    eGFR if non African American 16 (A) >60 mL/min/1.73 m^2   CBC auto differential    Collection Time: 01/16/17  4:45 AM   Result Value Ref Range    WBC 6.22 3.90 - 12.70 K/uL    RBC 3.56 (L) 4.00 - 5.40 M/uL    Hemoglobin 11.6 (L) 12.0 - 16.0 g/dL    Hematocrit 34.6 (L) 37.0 - 48.5 %    MCV 97 82 - 98 fL    MCH 32.6 (H) 27.0 - 31.0 pg    MCHC 33.5 32.0 - 36.0 %    RDW 17.7 (H) 11.5 - 14.5 %    Platelets 147 (L) 150 - 350 K/uL    MPV 10.1 9.2 - 12.9 fL    Gran # 4.0 1.8 - 7.7 K/uL    Lymph # 0.7 (L) 1.0 - 4.8 K/uL    Mono # 1.3 (H) 0.3 - 1.0 K/uL    Eos # 0.1 0.0 - 0.5 K/uL    Baso # 0.02 0.00 - 0.20 K/uL    Gran% 64.0 38.0 - 73.0 %    Lymph% 11.1 (L) 18.0 - 48.0 %    Mono% 21.2 (H) 4.0 - 15.0 %    Eosinophil% 1.8 0.0 - 8.0 %    Basophil% 0.3 0.0 - 1.9 %    Differential Method Automated    CK    Collection Time: 01/16/17  4:45 AM   Result Value Ref Range     20 - 180 U/L     Current Imaging Results:    Imaging Results         CT Cervical Spine Without Contrast (Final result) Result time:   01/15/17 13:42:21    Final result by Shakila Arango MD (01/15/17 13:42:21)    Impression:      No acute fracture.       Electronically signed by: SHAKILA ARANGO MD  Date:     01/15/17  Time:    13:42     Narrative:    CT CERVICAL SPINE WITHOUT CONTRAST  CLINICAL INDICATION:  fall.    TECHNIQUE: 2mm axial images were acquired of the cervical spine without the administration of contrast. Coronal and sagittal reconstructions were performed.     COMPARISON: None      FINDINGS:    Spinal alignment is within normal limits. The vertebral body heights are adequately maintained. The intervertebral disk spaces are adequately maintained. There is no evidence of lytic or blastic lesions. There is no evidence of fracture or dislocation.      There is mild degenerative change of the lower cervical spine.    The remainder of the visualized osseous structures and soft tissues appear unremarkable.            CT Maxillofacial Without Contrast (Final result) Result time:  01/15/17 13:20:17    Final result by Shakila Arango MD (01/15/17 13:20:17)    Impression:     No acute fracture.      Electronically signed by: SHAKILA ARANGO MD  Date:     01/15/17  Time:    13:20     Narrative:      EXAM: Maxillofacial CT without contrast    CLINICAL INDICATION:  fall.    TECHNIQUE:  0.625  mm images were acquired through the paranasal sinuses from just above the frontal sinus down to just below the mandible without contrast were acquired.    COMPARISON:None.    FINDINGS: The frontal sinuses, frontal ethmoid recesses, ethmoid sinuses, sphenoid sinuses, and maxillary sinuses are clear.  Ostiomeatal complexes are patent bilaterally.  The nasal bones are intact and the nasal septum is midline.  The orbits and orbital contents have normal appearance.    There is no evidence of acute fracture.            CT Head Without Contrast (Final result) Result time:  01/15/17 13:18:26    Final result by Shakila Arango MD (01/15/17 13:18:26)     Impression:     No significant change from previous exam with evidence of large remote right temporoparietal infarct and small left cerebellar remote infarct.      Electronically signed by: SHAKILA ARANGO MD  Date:     01/15/17  Time:    13:18     Narrative:      EXAM: Non-contrast head CT    CLINICAL INDICATION:  head injury.    TECHNIQUE: Routine cranial CT was acquired in the axial plane without contrast and coronal and sagittal reconstructions were performed.    COMPARISON:Prior from 12/16/13    FINDINGS: The brain parenchyma is of normal attenuation with no evidence of acute hemorrhage, mass lesion or new major vascular distribution infarct.  There is a large stable area of encephalomalacia in the right parietal temporal region and a smaller area in the left cerebellum consistent with remote infarcts. There are no extra-axial masses or fluid collections.  The ventricular system is of normal size for age and midline.     The visualized paranasal sinuses and mastoid air cells are clear.     There is no evidence of acute fracture.            X-Ray Chest 1 View (Final result) Result time:  01/15/17 12:48:06    Final result by Shakila Arango MD (01/15/17 12:48:06)    Impression:    Clear lungs.      Electronically signed by: SHAKILA ARANGO MD  Date:     01/15/17  Time:    12:48     Narrative:    EXAM:  AP CHEST RADIOGRAPH.     CLINICAL INDICATION:  Shortness of breath.    TECHNIQUE: Single AP view of the chest was obtained.     COMPARISON: Prior from 10/31/16    FINDINGS: The mediastinal structures are midline.  The cardiac silhouette is prominent but difficult to evaluate due to AP technique.  Is atherosclerotic calcification of the aortic arch. The lungs appear grossly clear.  No osseous abnormalities are identified.            ECG: Atrial fibrillation.

## 2017-01-17 NOTE — PROGRESS NOTES
[facility]  Cardiology  Progress Note    Patient Name: Sho Valencia  MRN: [id number]  Admission Date: 1/15/2017  Hospital Length of Stay: 2 days  Code Status: Full Code   Attending Physician: Ethan Nieves MD   Primary Care Physician: Yuri Villafuerte MD  Expected Discharge Date:   Principal Problem:Shortness of breath    Subjective:     Brief HPI:    Sho Valencia is a 83 y.o. female patient with hypertension and diabetes mellitus type 2. She has usually well compensated diastolic heart failure. She has chronic atrial fibrillation with a [id number] score of 8 ([id number]) having been on warfarin until 2016 when she was changed over to apixiban. 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 She presented with diastolic heart failure in 10/2016 and was diuresed with furosemide 40 mg ivp Q12. With that her dyspnea improved and the leg edema resolved. Her CKD had worsened and her DM was actually well controlled off both Januvia and insulin during her stay. Her O2 was low with ambulation after she completed diuresis why home O2 was arranged for. Her VRR was somewhat high on follow up and the dose of metoprolol was increased.     She recently had a fall at home when she hit the floor. The details of the fall are quite unclear. She now presents with increasing dyspnea to the ER. She was admitted.    Hospital Course:     CXR was essentially clear why diuretics were stopped.    Interval History:     Less confused. Comfortable supine.    Review of Systems   Constitutional: Positive for fatigue. Negative for chills and fever.   HENT: Positive for facial swelling.    Eyes: Negative for photophobia and visual disturbance.   Respiratory: Negative for chest tightness and shortness of breath.    Cardiovascular: Negative for chest pain, palpitations and leg swelling.   Gastrointestinal: Negative for abdominal distention, abdominal pain, diarrhea and  nausea.   Endocrine: Negative for cold intolerance and heat intolerance.   Genitourinary: Negative for dysuria, frequency and hematuria.   Musculoskeletal: Positive for arthralgias and gait problem. Negative for joint swelling and myalgias.   Skin: Negative for rash.   Neurological: Positive for weakness. Negative for light-headedness and headaches.   Hematological: Negative for adenopathy. Does not bruise/bleed easily.   Psychiatric/Behavioral: Positive for confusion. The patient is not nervous/anxious.      Objective:     Vital Signs (Most Recent):  Temp: 97.5 °F (36.4 °C) (01/17/17 1528)  Pulse: 90 (01/17/17 1600)  Resp: 16 (01/17/17 1528)  BP: 112/73 (01/17/17 1528)  SpO2: (!) 92 % (01/17/17 1528) Vital Signs (24h Range):  Temp:  [97.5 °F (36.4 °C)-98.6 °F (37 °C)] 97.5 °F (36.4 °C)  Pulse:  [] 90  Resp:  [16-18] 16  SpO2:  [92 %-100 %] 92 %  BP: ()/(64-75) 112/73     Weight: 54.4 kg (120 lb)  Body mass index is 21.95 kg/(m^2).    SpO2: (!) 92 %  O2 Device (Oxygen Therapy): room air      Intake/Output Summary (Last 24 hours) at 01/17/17 1742  Last data filed at 01/17/17 0600   Gross per 24 hour   Intake              420 ml   Output                0 ml   Net              420 ml       Lines/Drains/Airways     Peripheral Intravenous Line                 Peripheral IV - Single Lumen 01/15/17 1228 Left Antecubital 2 days                Physical Exam   Constitutional: She appears well-developed. She appears ill. No distress.   HENT:   Head: Normocephalic and atraumatic.   Nose: Nose normal.   Eyes: Right eye exhibits no discharge. Left eye exhibits no discharge. Right conjunctiva is not injected. Left conjunctiva is not injected. Right pupil is round. Left pupil is round. Pupils are equal.   Neck: No JVD present.   Cardiovascular: S1 normal and S2 normal.  An irregularly irregular rhythm present.   Murmur heard.   Systolic murmur is present    No diastolic murmur is present   Pulmonary/Chest: Effort normal  and breath sounds normal.   Abdominal: Soft. Normal appearance. There is no tenderness.   Musculoskeletal:        Left ankle: She exhibits no swelling.   Lymphadenopathy:        Head (right side): No submandibular adenopathy present.        Head (left side): No submandibular adenopathy present.     She has no cervical adenopathy.   Neurological: She is alert. She is disoriented. No cranial nerve deficit.   Skin: Skin is dry. Ecchymosis (right face) noted. No rash noted.   Psychiatric: Cognition and memory are impaired.     Current Medications:     allopurinol  100 mg Oral Daily    aspirin  81 mg Oral Daily    citalopram  20 mg Oral BID    divalproex  250 mg Oral BID    famotidine  20 mg Oral BID    heparin (porcine)  5,000 Units Subcutaneous Q12H    metoprolol succinate  100 mg Oral Daily    ofloxacin  5 drop Left Eye TID    senna-docusate 8.6-50 mg  1 tablet Oral BID    SITagliptan  25 mg Oral Daily    sodium bicarbonate  650 mg Oral BID    vitamin D  1,000 Units Oral Daily     Current Laboratory Results:    Recent Results (from the past 24 hour(s))   Basic metabolic panel    Collection Time: 01/17/17  4:38 AM   Result Value Ref Range    Sodium 133 (L) 136 - 145 mmol/L    Potassium 3.4 (L) 3.5 - 5.1 mmol/L    Chloride 101 95 - 110 mmol/L    CO2 17 (L) 23 - 29 mmol/L    Glucose 165 (H) 70 - 110 mg/dL    BUN, Bld 80 (H) 8 - 23 mg/dL    Creatinine 2.8 (H) 0.5 - 1.4 mg/dL    Calcium 8.7 8.7 - 10.5 mg/dL    Anion Gap 15 8 - 16 mmol/L    eGFR if African American 17 (A) >60 mL/min/1.73 m^2    eGFR if non African American 15 (A) >60 mL/min/1.73 m^2   CBC auto differential    Collection Time: 01/17/17  4:38 AM   Result Value Ref Range    WBC 8.41 3.90 - 12.70 K/uL    RBC 3.72 (L) 4.00 - 5.40 M/uL    Hemoglobin 12.2 12.0 - 16.0 g/dL    Hematocrit 35.9 (L) 37.0 - 48.5 %    MCV 97 82 - 98 fL    MCH 32.8 (H) 27.0 - 31.0 pg    MCHC 34.0 32.0 - 36.0 %    RDW 17.9 (H) 11.5 - 14.5 %    Platelets 153 150 - 350 K/uL     MPV 10.4 9.2 - 12.9 fL    Gran # 5.7 1.8 - 7.7 K/uL    Lymph # 0.8 (L) 1.0 - 4.8 K/uL    Mono # 1.7 (H) 0.3 - 1.0 K/uL    Eos # 0.1 0.0 - 0.5 K/uL    Baso # 0.05 0.00 - 0.20 K/uL    Gran% 67.3 38.0 - 73.0 %    Lymph% 9.9 (L) 18.0 - 48.0 %    Mono% 20.3 (H) 4.0 - 15.0 %    Eosinophil% 0.7 0.0 - 8.0 %    Basophil% 0.6 0.0 - 1.9 %    Differential Method Automated    Brain natriuretic peptide    Collection Time: 01/17/17  4:38 AM   Result Value Ref Range    BNP 3654 (H) 0 - 99 pg/mL   Magnesium    Collection Time: 01/17/17  4:38 AM   Result Value Ref Range    Magnesium 2.2 1.6 - 2.6 mg/dL   Uric acid    Collection Time: 01/17/17  4:38 AM   Result Value Ref Range    Uric Acid 1.3 (L) 2.4 - 5.7 mg/dL   Hathaway's Stain, Urine Random    Collection Time: 01/17/17  9:58 AM   Result Value Ref Range    Hathaway's Stain, Ur No eosinophils seen No eosinophils seen   Creatinine, urine, random    Collection Time: 01/17/17  9:59 AM   Result Value Ref Range    Creatinine, Random Ur 70.3 15.0 - 325.0 mg/dL   Sodium, urine, random    Collection Time: 01/17/17  9:59 AM   Result Value Ref Range    Sodium Urine Random 30 20 - 250 mmol/L     Current Imaging Results:    Imaging Results         CT Cervical Spine Without Contrast (Final result) Result time:  01/15/17 13:42:21    Final result by Shakila Arango MD (01/15/17 13:42:21)    Impression:      No acute fracture.       Electronically signed by: SHAKILA ARANGO MD  Date:     01/15/17  Time:    13:42     Narrative:    CT CERVICAL SPINE WITHOUT CONTRAST  CLINICAL INDICATION:  fall.    TECHNIQUE: 2mm axial images were acquired of the cervical spine without the administration of contrast. Coronal and sagittal reconstructions were performed.     COMPARISON: None      FINDINGS:    Spinal alignment is within normal limits. The vertebral body heights are adequately maintained. The intervertebral disk spaces are adequately maintained. There is no evidence of lytic or blastic lesions. There  is no evidence of fracture or dislocation.      There is mild degenerative change of the lower cervical spine.    The remainder of the visualized osseous structures and soft tissues appear unremarkable.            CT Maxillofacial Without Contrast (Final result) Result time:  01/15/17 13:20:17    Final result by Shakila Arango MD (01/15/17 13:20:17)    Impression:     No acute fracture.      Electronically signed by: SHAKILA ARANGO MD  Date:     01/15/17  Time:    13:20     Narrative:      EXAM: Maxillofacial CT without contrast    CLINICAL INDICATION:  fall.    TECHNIQUE:  0.625  mm images were acquired through the paranasal sinuses from just above the frontal sinus down to just below the mandible without contrast were acquired.    COMPARISON:None.    FINDINGS: The frontal sinuses, frontal ethmoid recesses, ethmoid sinuses, sphenoid sinuses, and maxillary sinuses are clear.  Ostiomeatal complexes are patent bilaterally.  The nasal bones are intact and the nasal septum is midline.  The orbits and orbital contents have normal appearance.    There is no evidence of acute fracture.            CT Head Without Contrast (Final result) Result time:  01/15/17 13:18:26    Final result by Shakila Arango MD (01/15/17 13:18:26)    Impression:     No significant change from previous exam with evidence of large remote right temporoparietal infarct and small left cerebellar remote infarct.      Electronically signed by: SHAKILA ARANGO MD  Date:     01/15/17  Time:    13:18     Narrative:      EXAM: Non-contrast head CT    CLINICAL INDICATION:  head injury.    TECHNIQUE: Routine cranial CT was acquired in the axial plane without contrast and coronal and sagittal reconstructions were performed.    COMPARISON:Prior from 12/16/13    FINDINGS: The brain parenchyma is of normal attenuation with no evidence of acute hemorrhage, mass lesion or new major vascular distribution infarct.  There is a large stable area of  encephalomalacia in the right parietal temporal region and a smaller area in the left cerebellum consistent with remote infarcts. There are no extra-axial masses or fluid collections.  The ventricular system is of normal size for age and midline.     The visualized paranasal sinuses and mastoid air cells are clear.     There is no evidence of acute fracture.            X-Ray Chest 1 View (Final result) Result time:  01/15/17 12:48:06    Final result by Shakila Arango MD (01/15/17 12:48:06)    Impression:    Clear lungs.      Electronically signed by: SHAKILA ARANGO MD  Date:     01/15/17  Time:    12:48     Narrative:    EXAM:  AP CHEST RADIOGRAPH.     CLINICAL INDICATION:  Shortness of breath.    TECHNIQUE: Single AP view of the chest was obtained.     COMPARISON: Prior from 10/31/16    FINDINGS: The mediastinal structures are midline.  The cardiac silhouette is prominent but difficult to evaluate due to AP technique.  Is atherosclerotic calcification of the aortic arch. The lungs appear grossly clear.  No osseous abnormalities are identified.              Assessment and Plan:     Problem List:    Active Diagnoses:    Diagnosis Date Noted POA    PRINCIPAL PROBLEM:  Shortness of breath [R06.02] 06/05/2016 Yes    Heart failure, diastolic, chronic [I50.32] 12/16/2013 Yes    Chronic anticoagulation [Z79.01] 06/05/2016 Not Applicable    Acute renal failure [N17.9] 06/05/2013 Unknown    Atrial fibrillation [I48.91] 04/03/2013 Yes    Hypoxemia [R09.02] 11/02/2016 Yes    Hypertension [I10] 12/16/2013 Yes    Chronic kidney disease, stage IV (severe) [N18.4] 12/16/2013 Yes    History of cerebrovascular accident [Z86.73] 07/10/2013 Not Applicable    Diabetes mellitus, type 2 [E11.9] 04/03/2013 Yes      Problems Resolved During this Admission:    Diagnosis Date Noted Date Resolved POA     Assessment and Plan:    1. Heart Failure, Diastolic, Chronic              6/16/2016: Echo: Normal left ventricular size  with hyperdynamic systolic function. Moderately dilated LA. Aortic valve sclerosis.              Usually on furosemide 40 mg Q24.              Overall well compensated.     2. Atrial Fibrillation              2010: Diagnosed with chronic atrial fibrillation.                [id number]= 8 ([id number]).              11/28/2016: Holter: Atrial fibrillation 87 () bpm. Rare VPCs.                          On metoprolol 100 mg Q24.              Rate appears well controlled.      3. Chronic Anticoagulation              2010: Diagnosed with chronic atrial fibrillation.                [id number]= 8 ([id number]).              2010: Began warfarin.              2016: Began apixiban.              On apixiban 2.5 mg Q12.              With worsening CKD, fall and worsening fragility do mnot believe benefit with anticoagulation outweigh risk even though [id number] is quite high.              Stopped apixiban.               Aspirin 81 mg long term.     4. History of Cerebrovascular Accident            2007: Diagnosed with CVA. Confusion.     5. Hypertension            2000: Diagnosed.            On metoprolol 100 mg Q24 and furosemide 40 mg Q24.            Appears well controlled.     6. Diabetes Mellitus, Type 2            2000: Diagnosed. Complications: Retinopathy & CKD. Medications: Diet.            Does not appear to need insulin or Januvia.            Fran Chaidez.     7. Chronic Kidney Disease, Stage 4            6/6/2016: BUN/crea 35/1.6. CrCl 30.            10/29/2016: BUN/crea 56/2.0. CrCl 23.             Now further worsening.             Dr. Gerber Jorge Jr..             Dr. Celine Contreras.     8. Chronic Obstructive Pulmonary Disease             Hypoxemia.             Resting 92% down to 87% with activities              On home O2.     9. Weakness             Rolling walker and wheelchair.     10. Primary Care             Dr. Yuri Villafuerte.        VTE Risk Mitigation         Ordered     heparin (porcine) injection  5,000 Units  Every 12 hours     Route:  Subcutaneous        01/17/17 0940     Medium Risk of VTE  Once      01/15/17 1740     Place sequential compression device  Until discontinued      01/15/17 1740          Anticipated Disposition: Home or Self Care    Discharge Needs: None unusual planned.    Ethan Nieves MD  Cardiology  [facility]

## 2017-01-17 NOTE — H&P
[facility]  Cardiology  History and Physical     Patient Name: Sho Valencia  MRN: [id number]  Admission Date: 1/15/2017  Code Status: Full Code   Attending Provider: Ethan Nieves MD   Primary Care Physician: Yuri Villafuerte MD  Principal Problem:Shortness of breath    Patient information was obtained from patient, past medical records and ER records.     Subjective:     Chief Complaint:  Shortness of Breath    HPI:  Sho Valencia is a 83 y.o. female patient with hypertension and diabetes mellitus type 2. She has usually well compensated diastolic heart failure. She has chronic atrial fibrillation with a [id number] score of 8 ([id number]) having been on warfarin until 2016 when she was changed over to apixiban. 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 She presented with diastolic heart failure in 10/2016 and was diuresed with furosemide 40 mg ivp Q12. With that her dyspnea improved and the leg edema resolved. Her CKD had worsened and her DM was actually well controlled off both Januvia and insulin during her stay. Her O2 was low with ambulation after she completed diuresis why home O2 was arranged for. Her VRR was somewhat high on follow up and the dose of metoprolol was increased.    She recently had a fall at home when she hit the floor. The details of the fall are quite unclear. She now presents with increasing dyspnea to the ER. She was admitted.      Past Medical History   Diagnosis Date    Anticoagulant long-term use     Atrial fibrillation     CHF (congestive heart failure)     CKD (chronic kidney disease), stage III     COPD (chronic obstructive pulmonary disease)     Coronary artery disease     Diabetes mellitus     Encounter for blood transfusion     Hypertension     Hypoxemia 11/2/2016 11/1/2016: Diagnosed. O2 Sat: Rest 92%. Ambulation 87%.    Osteoarthritis     Pneumonia     Stroke      2008     Vitamin D deficiency        Past Surgical History   Procedure Laterality Date    Rotator cuff repair      Hysterectomy      Total knee arthroplasty      Appendectomy      Eye surgery       cataracts removed from both eyes    Joint replacement       knee replacements       Review of patient's allergies indicates:   Allergen Reactions    Bactrim [sulfamethoxazole-trimethoprim]     Sulfa (sulfonamide antibiotics)        No current facility-administered medications on file prior to encounter.      Current Outpatient Prescriptions on File Prior to Encounter   Medication Sig    allopurinol (ZYLOPRIM) 100 MG tablet Take 1 tablet (100 mg total) by mouth once daily.    citalopram (CELEXA) 20 MG tablet Take 20 mg by mouth 2 (two) times daily.     divalproex (DEPAKOTE) 250 MG EC tablet 2 (two) times daily.     ELIQUIS 2.5 mg Tab Take 1 tablet (2.5 mg total) by mouth 2 (two) times daily.    furosemide (LASIX) 40 MG tablet Take 1 tablet (40 mg total) by mouth once daily. (Patient taking differently: Take 20 mg by mouth once daily. )    metoprolol succinate (TOPROL-XL) 100 MG 24 hr tablet Take 1 tablet (100 mg total) by mouth once daily.    mirabegron 50 mg Tb24 Take 1 tablet (50 mg total) by mouth once daily.    ofloxacin (OCUFLOX) 0.3 % ophthalmic solution INSTILL 5 DROPS INTO LEFT EYE 3 TIMES A DAY FOR 10 DAYS    sodium bicarbonate 325 MG tablet Take 325 mg by mouth 3 (three) times daily.     cephALEXin (KEFLEX) 500 MG capsule TAKE 1 CAPSULE (500 MG TOTAL) BY MOUTH EVERY 8 (EIGHT) HOURS.     Family History     Problem Relation (Age of Onset)    Arthritis Mother, Father    Diabetes Mother    Hearing loss Mother        Social History Main Topics    Smoking status: Former Smoker     Types: Cigarettes    Smokeless tobacco: Never Used    Alcohol use No    Drug use: No    Sexual activity: No     Review of Systems   Constitutional: Positive for fatigue. Negative for fever.   HENT: Positive for facial swelling.  Negative for rhinorrhea.         Extensive ecchymosis of right side of face.   Eyes: Negative for photophobia, discharge, redness, itching and visual disturbance.   Respiratory: Positive for shortness of breath. Negative for apnea, cough, choking, chest tightness, wheezing and stridor.    Cardiovascular: Negative for chest pain, palpitations and leg swelling.   Gastrointestinal: Negative for abdominal distention.   Endocrine: Negative for cold intolerance and heat intolerance.   Genitourinary: Negative for dysuria and hematuria.   Musculoskeletal: Positive for arthralgias and gait problem. Negative for joint swelling.   Skin: Negative for pallor and rash.   Neurological: Positive for weakness. Negative for dizziness, seizures, light-headedness, numbness and headaches.   Hematological: Negative for adenopathy.   Psychiatric/Behavioral: Negative for confusion and hallucinations.     Objective:     Vital Signs (Most Recent):  Temp: 98.4 °F (36.9 °C) (01/16/17 1511)  Pulse: (!) 121 (01/16/17 1800)  Resp: 16 (01/16/17 1511)  BP: 100/68 (01/16/17 1511)  SpO2: 98 % (01/16/17 1217) Vital Signs (24h Range):  Temp:  [97.6 °F (36.4 °C)-98.4 °F (36.9 °C)] 98.4 °F (36.9 °C)  Pulse:  [] 121  Resp:  [16-18] 16  SpO2:  [96 %-98 %] 98 %  BP: (100-121)/(66-79) 100/68     Weight: 54.4 kg (120 lb)  Body mass index is 21.95 kg/(m^2).    SpO2: 98 %  O2 Device (Oxygen Therapy): nasal cannula      Intake/Output Summary (Last 24 hours) at 01/16/17 1950  Last data filed at 01/16/17 1504   Gross per 24 hour   Intake              700 ml   Output                1 ml   Net              699 ml       Lines/Drains/Airways     Peripheral Intravenous Line                 Peripheral IV - Single Lumen 01/15/17 1228 Left Antecubital 1 day                Physical Exam   Constitutional: She appears well-developed and well-nourished.  Non-toxic appearance. She does not appear ill. No distress.   HENT:   Head: Normocephalic. Head is with contusion  (right side of face).   Mouth/Throat: No oropharyngeal exudate.   Eyes: Right eye exhibits no discharge. Left eye exhibits no discharge. Right conjunctiva is not injected. Left conjunctiva is not injected. Right pupil is round. Left pupil is round. Pupils are equal.   Neck: No JVD present. Carotid bruit is not present.   Cardiovascular: S1 normal and S2 normal.  An irregularly irregular rhythm present.   Murmur heard.   Systolic murmur is present   Pulses:       Radial pulses are 2+ on the right side, and 2+ on the left side.        Dorsalis pedis pulses are 1+ on the right side, and 1+ on the left side.        Posterior tibial pulses are 1+ on the right side, and 1+ on the left side.   Pulmonary/Chest: Effort normal. She has rhonchi in the right lower field. She has no rales.   Abdominal: Soft. Normal appearance. There is no tenderness.   Musculoskeletal:        Right ankle: She exhibits no swelling and no ecchymosis.        Left ankle: She exhibits no swelling and no ecchymosis.   Lymphadenopathy:        Head (right side): No submandibular adenopathy present.        Head (left side): No submandibular adenopathy present.     She has no cervical adenopathy.   Skin: Skin is warm and dry. No rash noted. She is not diaphoretic.   Psychiatric: She has a normal mood and affect. Her speech is normal and behavior is normal. Thought content normal. Cognition and memory are impaired.       Current Medications:     allopurinol  100 mg Oral Daily    apixaban  2.5 mg Oral BID    citalopram  20 mg Oral BID    divalproex  250 mg Oral BID    famotidine  20 mg Oral BID    furosemide  20 mg Oral Daily    metoprolol succinate  100 mg Oral Daily    ofloxacin  5 drop Left Eye TID    senna-docusate 8.6-50 mg  1 tablet Oral BID    [START ON 1/17/2017] SITagliptan  25 mg Oral Daily    sodium bicarbonate  325 mg Oral TID    vitamin D  1,000 Units Oral Daily     Current Laboratory Results:    Recent Results (from the past 24  hour(s))   Basic metabolic panel    Collection Time: 01/16/17  4:45 AM   Result Value Ref Range    Sodium 135 (L) 136 - 145 mmol/L    Potassium 3.5 3.5 - 5.1 mmol/L    Chloride 104 95 - 110 mmol/L    CO2 15 (L) 23 - 29 mmol/L    Glucose 121 (H) 70 - 110 mg/dL    BUN, Bld 78 (H) 8 - 23 mg/dL    Creatinine 2.6 (H) 0.5 - 1.4 mg/dL    Calcium 8.6 (L) 8.7 - 10.5 mg/dL    Anion Gap 16 8 - 16 mmol/L    eGFR if African American 19 (A) >60 mL/min/1.73 m^2    eGFR if non African American 16 (A) >60 mL/min/1.73 m^2   CBC auto differential    Collection Time: 01/16/17  4:45 AM   Result Value Ref Range    WBC 6.22 3.90 - 12.70 K/uL    RBC 3.56 (L) 4.00 - 5.40 M/uL    Hemoglobin 11.6 (L) 12.0 - 16.0 g/dL    Hematocrit 34.6 (L) 37.0 - 48.5 %    MCV 97 82 - 98 fL    MCH 32.6 (H) 27.0 - 31.0 pg    MCHC 33.5 32.0 - 36.0 %    RDW 17.7 (H) 11.5 - 14.5 %    Platelets 147 (L) 150 - 350 K/uL    MPV 10.1 9.2 - 12.9 fL    Gran # 4.0 1.8 - 7.7 K/uL    Lymph # 0.7 (L) 1.0 - 4.8 K/uL    Mono # 1.3 (H) 0.3 - 1.0 K/uL    Eos # 0.1 0.0 - 0.5 K/uL    Baso # 0.02 0.00 - 0.20 K/uL    Gran% 64.0 38.0 - 73.0 %    Lymph% 11.1 (L) 18.0 - 48.0 %    Mono% 21.2 (H) 4.0 - 15.0 %    Eosinophil% 1.8 0.0 - 8.0 %    Basophil% 0.3 0.0 - 1.9 %    Differential Method Automated    CK    Collection Time: 01/16/17  4:45 AM   Result Value Ref Range     20 - 180 U/L     Current Imaging Results:    Imaging Results         CT Cervical Spine Without Contrast (Final result) Result time:  01/15/17 13:42:21    Final result by Shakila Arango MD (01/15/17 13:42:21)    Impression:      No acute fracture.       Electronically signed by: SHAKILA ARANGO MD  Date:     01/15/17  Time:    13:42     Narrative:    CT CERVICAL SPINE WITHOUT CONTRAST  CLINICAL INDICATION:  fall.    TECHNIQUE: 2mm axial images were acquired of the cervical spine without the administration of contrast. Coronal and sagittal reconstructions were performed.     COMPARISON:  None      FINDINGS:    Spinal alignment is within normal limits. The vertebral body heights are adequately maintained. The intervertebral disk spaces are adequately maintained. There is no evidence of lytic or blastic lesions. There is no evidence of fracture or dislocation.      There is mild degenerative change of the lower cervical spine.    The remainder of the visualized osseous structures and soft tissues appear unremarkable.            CT Maxillofacial Without Contrast (Final result) Result time:  01/15/17 13:20:17    Final result by Shakila Arango MD (01/15/17 13:20:17)    Impression:     No acute fracture.      Electronically signed by: SHAKILA ARANGO MD  Date:     01/15/17  Time:    13:20     Narrative:      EXAM: Maxillofacial CT without contrast    CLINICAL INDICATION:  fall.    TECHNIQUE:  0.625  mm images were acquired through the paranasal sinuses from just above the frontal sinus down to just below the mandible without contrast were acquired.    COMPARISON:None.    FINDINGS: The frontal sinuses, frontal ethmoid recesses, ethmoid sinuses, sphenoid sinuses, and maxillary sinuses are clear.  Ostiomeatal complexes are patent bilaterally.  The nasal bones are intact and the nasal septum is midline.  The orbits and orbital contents have normal appearance.    There is no evidence of acute fracture.            CT Head Without Contrast (Final result) Result time:  01/15/17 13:18:26    Final result by Shakila Arango MD (01/15/17 13:18:26)    Impression:     No significant change from previous exam with evidence of large remote right temporoparietal infarct and small left cerebellar remote infarct.      Electronically signed by: SHAKILA ARANGO MD  Date:     01/15/17  Time:    13:18     Narrative:      EXAM: Non-contrast head CT    CLINICAL INDICATION:  head injury.    TECHNIQUE: Routine cranial CT was acquired in the axial plane without contrast and coronal and sagittal reconstructions were  performed.    COMPARISON:Prior from 12/16/13    FINDINGS: The brain parenchyma is of normal attenuation with no evidence of acute hemorrhage, mass lesion or new major vascular distribution infarct.  There is a large stable area of encephalomalacia in the right parietal temporal region and a smaller area in the left cerebellum consistent with remote infarcts. There are no extra-axial masses or fluid collections.  The ventricular system is of normal size for age and midline.     The visualized paranasal sinuses and mastoid air cells are clear.     There is no evidence of acute fracture.            X-Ray Chest 1 View (Final result) Result time:  01/15/17 12:48:06    Final result by Shakila Arango MD (01/15/17 12:48:06)    Impression:    Clear lungs.      Electronically signed by: SHAKILA ARANGO MD  Date:     01/15/17  Time:    12:48     Narrative:    EXAM:  AP CHEST RADIOGRAPH.     CLINICAL INDICATION:  Shortness of breath.    TECHNIQUE: Single AP view of the chest was obtained.     COMPARISON: Prior from 10/31/16    FINDINGS: The mediastinal structures are midline.  The cardiac silhouette is prominent but difficult to evaluate due to AP technique.  Is atherosclerotic calcification of the aortic arch. The lungs appear grossly clear.  No osseous abnormalities are identified.            ECG: Atrial fibrillation.    Assessment and Plan:     1. Heart Failure, Diastolic, Chronic   6/16/2016: Echo: Normal left ventricular size with hyperdynamic systolic function. Moderately dilated LA. Aortic valve sclerosis.              Usually on furosemide 40 mg Q24.              Overall well compensated.     2. Atrial Fibrillation              2010: Diagnosed with chronic atrial fibrillation.                [id number]= 8 ([id number]).   11/28/2016: Holter: Atrial fibrillation 87 () bpm. Rare VPCs.               On metoprolol 100 mg Q24.   Rate appears well controlled.      3. Chronic Anticoagulation              2010:  Diagnosed with chronic atrial fibrillation.                [id number]= 8 ([id number]).              2010: Began warfarin.              2016: Began apixiban.              On apixiban 2.5 mg Q12.              With worsening CKD, fall and worsening fragility do mnot believe benefit with anticoagulation outweigh risk even though [id number] is quite high.   Will stop apixiban.   Aspirin 81 mg long term.     4. History of Cerebrovascular Accident            2007: Diagnosed with CVA. Confusion.     5. Hypertension            2000: Diagnosed.            On metoprolol 100 mg Q24 and furosemide 40 mg Q24.            Appears well controlled.     6. Diabetes Mellitus, Type 2            2000: Diagnosed. Complications: Retinopathy & CKD. Medications: Diet.            Does not appear to need insulin or Januvia.            Fran Chaidez.     7. Chronic Kidney Disease, Stage 4            6/6/2016: BUN/crea 35/1.6. CrCl 30.            10/29/2016: BUN/crea 56/2.0. CrCl 23.             Now further worsening.             Dr. Gerber Jorge Jr..                        Dr. Celine Contreras.     8. Chronic Obstructive Pulmonary Disease                        Hypoxemia.                        Resting 92% down to 87% with activities                         On home O2.     9. Weakness                        Discussed issues at home.                        Rolling walker and wheelchair.     10. Primary Care                        Dr. Yuri Villafuerte.       VTE Risk Mitigation         Ordered     Medium Risk of VTE  Once      01/15/17 1740     Place sequential compression device  Until discontinued      01/15/17 1740          Ethan Nieves MD  Cardiology   [facility]

## 2017-01-17 NOTE — PHYSICIAN QUERY
"PT Name: Sho Valencia  MR #: [id number]     Physician Query Form - Documentation Clarification    Reviewer Shaila Guaman RN, [email or web address]    This form is a permanent document in the medical record.     Query Date: January 17, 2017  By submitting this query, we are merely seeking further clarification of documentation to reflect the severity of illness of your patient. Please utilize your independent clinical judgment when addressing the question(s) below.    (The Medical record reflects the following:)      Supporting Clinical Findings Location in Medical Record     "Persistent KIRILL on CKD IIIb-IV with baseline creatinine 2.2 likely secondary to decompensated heart failure?"   "Exam today without crackles or edema but....why is bnp so high? Hold diuretics for now since renal fxn worsening."  "Shortness of breath likely from decompensated heart failure. Hold Diuresis for now and await further recommendations from cardiology."    NSR=[id number]  KSD=3013   1/17-Nephrology PN                1/15-Labs  1/17-Labs      Heart Failure, Diastolic, Chronic  6/16/2016: Echo: Normal left ventricular size with hyperdynamic systolic function. Moderately dilated LA. Aortic valve sclerosis.              Usually on furosemide 40 mg Q24.              Overall well compensated.       1/16-H&P                                                                          Doctor, Please specify diagnosis or diagnoses associated with above clinical findings.    Please provide clarification of the pts Diastolic CHF dx. Thank you.    Physician Use Only     [x   ] Chronic Diastolic CHF     [   ] Acute on Chronic Diastolic CHF     [   ] Other clarification (please specify)_____________________________                                                                                                          [   ] Unable to determine            "

## 2017-01-17 NOTE — PLAN OF CARE
D/C Planning:    Jazmyne AMEZQUITA approved 3-in-1 commode to be pulled from closet upon D/C date    Writer spoke with patient's daughter Yareli; she wants to know if Inpatient rehab is possible vs HH with PT/OT. Writer will inquire from PT.    CM to follow

## 2017-01-17 NOTE — PROGRESS NOTES
"Nephrology  Progress Note    Admit Date: 1/15/2017   LOS: 2 days     SUBJECTIVE:     Follow-up For:  Shortness of breath/KIRILL    Interval History:     Looks and feels better today.  Sitting on bedside with PT.  Denies CP/SOB.  Renal fxn labile and pt incontinent of urine so hard to measure accurate outpt. Very [place].     Review of Systems:  Constitutional: No fever or chills  Respiratory: No cough or shortness of breath  Cardiovascular: No chest pain or palpitations  Gastrointestinal: No nausea or vomiting  Neurological: No confusion or weakness    OBJECTIVE:     Vital Signs Range (Last 24H):  Visit Vitals    /75 (BP Location: Right arm, Patient Position: Lying, BP Method: Automatic)    Pulse 91    Temp 97.5 °F (36.4 °C) (Oral)    Resp 18    Ht 5' 2" (1.575 m)    Wt 54.4 kg (120 lb)    LMP  (LMP Unknown)    SpO2 97%    Breastfeeding No    BMI 21.95 kg/m2       Temp:  [97.5 °F (36.4 °C)-98.6 °F (37 °C)]   Pulse:  []   Resp:  [16-18]   BP: ()/(64-75)   SpO2:  [96 %-98 %]     I & O (Last 24H):  Intake/Output Summary (Last 24 hours) at 01/17/17 0945  Last data filed at 01/17/17 0600   Gross per 24 hour   Intake              640 ml   Output                1 ml   Net              639 ml       Physical Exam:  General appearance: elderly/frail but NAD  Eyes:  Conjunctivae/corneas clear. PERRL.  Neck:  +JVD  Lungs: Normal respiratory effort,   Few rales/atelectic sounds.   Heart: Irr/Regular rate and rhythm, S1, S2 normal, no murmur, rub or donis.  Abdomen: Soft, non-tender non-distended; bowel sounds normal; no masses,  no organomegaly  Extremities: No cyanosis or clubbing. No edema.    Skin: Skin color, texture, turgor normal. No rashes or lesions  Neurologic: Normal strength and tone. No focal numbness or weakness     Laboratory Data:    Recent Labs  Lab 01/17/17  0438   WBC 8.41   RBC 3.72*   HGB 12.2   HCT 35.9*      MCV 97   MCH 32.8*   MCHC 34.0       BMP:   Recent Labs  Lab " 01/17/17  0438   *   *   K 3.4*      CO2 17*   BUN 80*   CREATININE 2.8*   CALCIUM 8.7     Lab Results   Component Value Date    CALCIUM 8.7 01/17/2017    PHOS 4.7 (H) 10/31/2016       BNP    Recent Labs  Lab 01/17/17  0438   BNP 3654*           Medications:  Medication list was reviewed and changes noted under Assessment/Plan.    Diagnostic Results:        ASSESSMENT/PLAN:     1. Persistent KIRILL on CKD IIIb-IV with baseline creatinine 2.2 likely secondary to decompensated heart failure? (N 17.9, N 18.4): No rhabdo. No Ace or ARB at this time. Last echo with NL EF/sysytolic fnc. Exan today without crackles or edema but....why is bnp so high? Hold diuretics for now since renal fxn worsening.  Follow trends closely. Renally dose meds, avoid nephrotoxins, and monitor I/O's closely.  2. Shortness of breath likely from decompensated heart failure (I 50.32): Hold Diuresis for now and await further recommendations from cardiology.  3. Recent fall (W 19.XXX a): CT neg.   4. Chronic metabolic acidosis/RTA (E 87.2, N25.89): Continue oral bicarbonate and follow trends. Check lactic acid level.  5. Mild hypokalemia (E87.6):  Replace and check mag.

## 2017-01-17 NOTE — NURSING
Pt assisted back to bed. Safety maintained. Made comfortable in bed. No complaints noted. HOB up at 30 degrees. Side railsx3 up[ and call light placed within reach. Bed alarm turned on.

## 2017-01-17 NOTE — PT/OT/SLP EVAL
Physical Therapy  Evaluation and Treatment     Sho Valencia   MRN: [id number]   Admitting Diagnosis: Shortness of breath    PT Received On: 01/17/17  PT Start Time: 0909     PT Stop Time: 0949    PT Total Time (min): 40 min       Billable Minutes:  Evaluation 20, Gait [id number] and Therapeutic Activity 10    Diagnosis: Shortness of breath    Past Medical History   Diagnosis Date    Anticoagulant long-term use     Atrial fibrillation     CHF (congestive heart failure)     CKD (chronic kidney disease), stage III     COPD (chronic obstructive pulmonary disease)     Coronary artery disease     Diabetes mellitus     Encounter for blood transfusion     Hypertension     Hypoxemia 11/2/2016 11/1/2016: Diagnosed. O2 Sat: Rest 92%. Ambulation 87%.    Osteoarthritis     Pneumonia     Stroke      2008    Vitamin D deficiency       Past Surgical History   Procedure Laterality Date    Rotator cuff repair      Hysterectomy      Total knee arthroplasty      Appendectomy      Eye surgery       cataracts removed from both eyes    Joint replacement       knee replacements     Referring physician: Ezequiel  Date referred to PT: 1/16/17    General Precautions: Standard, fall, hearing impaired (wears glasses)  Orthopedic Precautions: N/A     Do you have any cultural, spiritual, Gnosticism conflicts, given your current situation?: None specified    Patient History:  Living Environment Comment: Per pt sitter; pt lives with her  in a 2 story house with a lift chair for ascending/descending. She has a tub shower with grab bars and a shower chair and requires 75% assistance for bathing from sitter. Sitter is there 11:30-1:30. Pt is mod (I) with mobility using a RW for household distances and wheelchair for long distances. Per sitter report, pt has had steady decline in mobility and cognition since she began work.   Equipment Currently Used at Home: walker, rolling, wheelchair  DME owned (not currently  "used): none    Previous Level of Function:  Ambulation Skills: needs device  Transfer Skills: needs device  ADL Skills: needs device and assist  Work/Leisure Activity: needs device and assist    Subjective:  Communicated with RN prior to session.    Chief Complaint: Fear of falling with gait  Patient goals: None stated at this time     Pain Ratin/10   Pain Addressed: Nurse notified  Pain Rating Post-Intervention:  (pt reporting pain during gait but unable to locate or describe)    Objective:   Patient found with: peripheral IV, telemetry     Cognitive Exam:  Oriented to: Person    Follows Commands/attention: Easily distracted  Safety awareness/insight to disability: impaired    Physical Exam:  Postural examination/scapula alignment: Rounded shoulder and Head forward    Skin integrity: extensive echymosis of R side of face and echymosis noted to R lateral malleoli   Edema: None noted     Sensation:   Unable to determine     Lower Extremity Range of Motion:  Right Lower Extremity: WFL  Left Lower Extremity: WFL    Lower Extremity Strength:  Unable to properly determine     Gross motor coordination: WFL    Functional Mobility:  Bed Mobility:  Supine to Sit: Moderate Assistance (Pt able to long sit in bed with HOB 30 degrees; she required tactile cues and mod A to transfer BLE onto floor)    Transfers:  Sit <> Stand Assistance: Moderate Assistance (x 1 from EOB; x 1 from bedside chair; x 1 from BSC; pt required verbal and tactile cues for technique but unable to perform )  Sit <> Stand Assistive Device: Rolling Walker    Gait:   Gait Distance: x 6' from EOB towards restroom, pt became anxious and posterior trunk lean, bedside chair pulled up for pt to return to seated position and decreased anxiety and fear with mobility. Pt crying out "help me I'm falling"   Assistance 1: Moderate assistance  Gait Assistive Device: Rolling walker  Gait Pattern: reciprocal  Gait Deviation(s): decreased deb, increased time in " double stance, decreased velocity of limb motion, decreased step length, decreased stride length (posterior trunk lean)    Balance:   Static Sit: FAIR-: Maintains without assist but inconsistent   Dynamic Sit: POOR: N/A  Static Stand: POOR: Needs MODERATE assist to maintain  Dynamic stand: POOR: N/A    Therapeutic Activities and Exercises:  Pt voided of urine in     AM-PAC 6 CLICK MOBILITY  How much help from another person does this patient currently need?   1 = Unable, Total/Dependent Assistance  2 = A lot, Maximum/Moderate Assistance  3 = A little, Minimum/Contact Guard/Supervision  4 = None, Modified [place]/Independent    Turning over in bed (including adjusting bedclothes, sheets and blankets)?: 4  Sitting down on and standing up from a chair with arms (e.g., wheelchair, bedside commode, etc.): 2  Moving from lying on back to sitting on the side of the bed?: 2  Moving to and from a bed to a chair (including a wheelchair)?: 2  Need to walk in hospital room?: 2  Climbing 3-5 steps with a railing?: 1  Total Score: 13     AM-PAC Raw Score CMS G-Code Modifier Level of Impairment Assistance   6 % Total / Unable   7 - 9 CM 80 - 100% Maximal Assist   10 - 14 CL 60 - 80% Moderate Assist   15 - 19 CK 40 - 60% Moderate Assist   20 - 22 CJ 20 - 40% Minimal Assist   23 CI 1-20% SBA / CGA   24 CH 0% Independent/ Mod I     Patient left up in chair with all lines intact, call button in reach, RN notified and RN and sitter present.    Assessment:   Sho Valencia is a 83 y.o. female with a medical diagnosis of Shortness of breath and presents with history of frequent falls. Pt disoriented and fearful of mobility this session despite sitter being present. Pt would benefit from continued skilled PT to maximize independence and safety with functional mobility.    Rehab identified problem list/impairments: Rehab identified problem list/impairments: weakness, impaired self care skills, impaired functional mobilty,  impaired cognition, pain, impaired skin, decreased safety awareness, visual deficits, decreased lower extremity function, gait instability, impaired balance, impaired endurance, impaired cardiopulmonary response to activity    Rehab potential is fair.    Activity tolerance: Fair    Discharge recommendations: Discharge Facility/Level Of Care Needs: SNF     Barriers to discharge: Barriers to Discharge: Decreased caregiver support    Equipment recommendations: Equipment Needed After Discharge: 3-in-1 commode     GOALS:   Physical Therapy Goals        Problem: Physical Therapy Goal    Goal Priority Disciplines Outcome Goal Variances Interventions   Physical Therapy Goal     PT/OT, PT Ongoing (interventions implemented as appropriate)     Description:  Goals to be met by: 17     Patient will increase functional independence with mobility by performin. Supine to sit with SBA.   2. Sit to supine with SBA.   3. Sit<>stand transfer with min A.   4. Gait x 25' feet with Min A using rolling walker.             PLAN:    Patient to be seen 5 x/week to address the above listed problems via gait training, therapeutic activities, therapeutic exercises, neuromuscular re-education  Plan of Care expires: 17  Plan of Care reviewed with: patient, caregiver    Cinthya Corral, PT  2017

## 2017-01-18 PROBLEM — Z66 DNR (DO NOT RESUSCITATE): Status: ACTIVE | Noted: 2017-01-01

## 2017-01-18 NOTE — PT/OT/SLP PROGRESS
Physical Therapy  Treatment    Sho Valencia   MRN: [id number]   Admitting Diagnosis: Shortness of breath    PT Received On: 17  PT Start Time: 0940     PT Stop Time: 1014    PT Total Time (min): 34 min       Billable Minutes:  Gait [id number], Therapeutic Activity 14 and Neuromuscular Re-education 10    Treatment Type: Treatment  PT/PTA: PT           General Precautions: Standard, hearing impaired, fall  Orthopedic Precautions: N/A     Do you have any cultural, spiritual, Methodist conflicts, given your current situation?: None specified    Subjective:  Communicated with RN prior to session.  During middle of session, pt oriented to therapist name, person, stating she was in [facility], and able to give family history of grandchildren etc. Improved orientation from last session    Pain Ratin/10  Pain Rating Post-Intervention: 0/10    Objective:   Patient found with: telemetry, peripheral IV, oxygen  SpO2: 88% with activity on RA, mild SOB noted and pt placed back on O2     Functional Mobility:  Bed Mobility:   Scooting/Bridging: Minimum Assistance (scooting in seated towards edge of bed, pt required min A at trunk and increased time to complete with intermittent rest breaks and pt reoriented to task to perform )  Supine to Sit: Minimum Assistance (with HOB flat, pt required verbal cues for safety and technique and increased time to perform and use of bedrails for safety)    Transfers:  Sit <> Stand Assistance: Moderate Assistance, Contact Guard Assistance, Minimum Assistance (1st trial from EOB: Mod A; 2nd trial from bedside chair; min A; 3rd trial from EOB CGA)  Sit <> Stand Assistive Device: Rolling Walker    Gait:   Gait Distance: 2 x 8'; pt required verbal cues for technique and safety and min A for posterior trunk lean and walker management; she required increased time to perform and verbal cues to remain on task intermittently   Assistance 1: Minimum assistance  Gait Assistive  Device: Rolling walker  Gait Pattern: reciprocal  Gait Deviation(s): decreased deb, decreased step length, decreased stride length, decreased velocity of limb motion, increased time in double stance, decreased toe-to-floor clearance, decreased swing-to-stance ratio, decreased weight-shifting ability, backward lean (posterior trunk lean)    Balance:   Static Sit: FAIR+: Able to take MINIMAL challenges from all directions  Dynamic Sit: FAIR+: Maintains balance through MINIMAL excursions of active trunk motion  Static Stand: FAIR: Maintains without assist but unable to take challenges  Dynamic stand: POOR: N/A     Therapeutic Activities and Exercises:  Pt performed reaching activities with hand washing and grooming of hair at sink with CGA and verbal cues for safety      AM-PAC 6 CLICK MOBILITY  How much help from another person does this patient currently need?   1 = Unable, Total/Dependent Assistance  2 = A lot, Maximum/Moderate Assistance  3 = A little, Minimum/Contact Guard/Supervision  4 = None, Modified [place]/Independent    Turning over in bed (including adjusting bedclothes, sheets and blankets)?: 4  Sitting down on and standing up from a chair with arms (e.g., wheelchair, bedside commode, etc.): 2  Moving from lying on back to sitting on the side of the bed?: 3  Moving to and from a bed to a chair (including a wheelchair)?: 3  Need to walk in hospital room?: 3  Climbing 3-5 steps with a railing?: 1  Total Score: 16    AM-PAC Raw Score CMS G-Code Modifier Level of Impairment Assistance   6 % Total / Unable   7 - 9 CM 80 - 100% Maximal Assist   10 - 14 CL 60 - 80% Moderate Assist   15 - 19 CK 40 - 60% Moderate Assist   20 - 22 CJ 20 - 40% Minimal Assist   23 CI 1-20% SBA / CGA   24 CH 0% Independent/ Mod I     Patient left up in chair with all lines intact, call button in reach, RN notified and daughter present.    Assessment:  Prior to admission pt was modified independent with mobility and  max A for some self-care and there is expectation of returning to prior level of function to maintain independence avoiding readmission. Pt is at high risk of unplanned readmission due to fall risk and lack of 24 hour caregiver in prior setting. Pt with improved orientation this session, daughter present providing support. Pt anxious with mobility at beginning of session progressing with functional mobility and decreased fear of falling with treatment session. Pt would benefit from SNF in order to return to PLOF and decreased risk of fall and readmission. Pt would benefit from continued skilled PT to maximize independence and safety with functional mobility.     Rehab identified problem list/impairments: Rehab identified problem list/impairments: weakness, impaired functional mobilty, gait instability, decreased lower extremity function, impaired cognition, impaired cardiopulmonary response to activity, decreased safety awareness, impaired balance, impaired self care skills    Rehab potential is good.    Activity tolerance: Good    Discharge recommendations: Discharge Facility/Level Of Care Needs: nursing facility, skilled     Barriers to discharge: Barriers to Discharge: Decreased caregiver support    Equipment recommendations: Equipment Needed After Discharge: 3-in-1 commode     GOALS:   Physical Therapy Goals        Problem: Physical Therapy Goal    Goal Priority Disciplines Outcome Goal Variances Interventions   Physical Therapy Goal     PT/OT, PT Ongoing (interventions implemented as appropriate)     Description:  Goals to be met by: 17     Patient will increase functional independence with mobility by performin. Supine to sit with SBA.   2. Sit to supine with SBA.   3. Sit<>stand transfer with min A.   4. Gait x 25' feet with Min A using rolling walker.             PLAN:    Patient to be seen 6 x/week  to address the above listed problems via gait training, therapeutic activities, therapeutic  exercises, neuromuscular re-education  Plan of Care expires: 02/16/17  Plan of Care reviewed with: patient, daughter    Cinthya Dumonton, PT  01/18/2017

## 2017-01-18 NOTE — PLAN OF CARE
Discharge Planning:  Patient admitted on 1-15-17  LOS-day 3  Chart reviewed  Care plan discussed      Discussed care plan with treatment team  Discussed care plan with the attending Dr Nieves and Fran JONES  Current dispo -SNF per rec's  Case mgt to follow closely  Case mgt will forward clinicals and complete the LOCET/PASS-R today  Consults following are: PT, OT, nephrology, case mgt

## 2017-01-18 NOTE — PLAN OF CARE
Problem: Physical Therapy Goal  Goal: Physical Therapy Goal  Goals to be met by: 17     Patient will increase functional independence with mobility by performin. Supine to sit with SBA.   2. Sit to supine with SBA.   3. Sit<>stand transfer with min A.   4. Gait x 25 feet with Min A using rolling walker.   Outcome: Ongoing (interventions implemented as appropriate)  Pt with improved orientation this session, daughter present providing support. Pt anxious with mobility at beginning of session progressing with functional mobility and decreased fear of falling with treatment session. Pt would benefit from SNF in order to return to PLOF and decreased risk of fall and readmission. Please see progress note for details, POC, and recommendations.

## 2017-01-18 NOTE — PLAN OF CARE
Problem: Occupational Therapy Goal  Goal: Occupational Therapy Goal  Goals to be met by 2/6/18  1. Mod A BSC transfer  2. Min A UBD  3. Maximize UE strength for mobility  4.Complete 30 minute session without refusal to attempt tasks  Outcome: Ongoing (interventions implemented as appropriate)  OT evaluation completed with treatment initiated.  Recommend SNF placement at discharge.  DME: None.  MILTON Garcia 1/18/2017

## 2017-01-18 NOTE — PLAN OF CARE
D/C Planning:    Plan of care discussed with caregiver (daughterStaci [phone]) and treatment team (PT Cinthya, ROYAL Butts). Family states patient was going to the beauty parlor once a week, shopping at the mall with family/sitters, and active prior to admission. PT Cinthya stated she progressed in terms of taking steps with walker today; SNF may be appropriate at this time (per OT as well according to Cinthya).     Per Ronda (ROYAL) Jefe spoke with patient's daughter Staci, who agrees to referral for SNF may be appropriate at this time considering patient's history and baseline. According to Cinthya, patient's sitter has only been working with patient for 2 weeks, after speaking with daughter, sitter was unaware of patient's baseline.     Patient's daughter Staci signed My Choice form and IMMLuis Small's preferred facility is [facility]. Referrals will be sent out today for patient's admission.        01/18/17 1103   Discharge Reassessment   Assessment Type Discharge Planning Reassessment   Can the patient answer the patient profile reliably? No, cognitively impaired  (Spoke with daughter Staci [phone])   How does the patient rate their overall health at the present time? Fair   Describe the patient's ability to walk at the present time. Major restrictions/daily assistance from another person   How often would a person be available to care for the patient? Occasionally   Number of comorbid conditions (as recorded on the chart) Other neurological disorders  (dementia)   During the past month, has the patient often been bothered by feeling down, depressed or hopeless? No   During the past month, has the patient often been bothered by little interest or pleasure in doing things? No   Discharge plan remains the same: No  (SNF placement)   Provided patient/caregiver education on the expected discharge date and the discharge plan Yes   Discharge Plan A Skilled Nursing Facility   Discharge  Plan B Home Health   Change in patient condition or support system No   Patient choice form signed by patient/caregiver Yes   Explained to the the patient/caregiver why the discharge planned changed: Yes   Involved the patient/caregiver in establishing a new discharge plan: Yes

## 2017-01-18 NOTE — PROGRESS NOTES
"Nephrology  Progress Note    Admit Date: 1/15/2017   LOS: 3 days     SUBJECTIVE:     Follow-up For:  Shortness of breath/KIRILL    Interval History:    Patient is somewhat confused today.  Discussed with patient and daughter in detail at bedside.  Daughter requested skilled nursing and DO NOT RESUSCITATE.  Denies chest pain or shortness of breath.  Renal function improved with holding diuretics.    Review of Systems:  Constitutional: No fever or chills  Respiratory: No cough or shortness of breath  Cardiovascular: No chest pain or palpitations  Gastrointestinal: No nausea or vomiting  Neurological: No confusion or weakness    OBJECTIVE:     Vital Signs Range (Last 24H):  Visit Vitals    /68 (BP Location: Left arm, Patient Position: Lying, BP Method: Automatic)    Pulse 92    Temp 97.2 °F (36.2 °C) (Oral)    Resp 16    Ht 5' 2" (1.575 m)    Wt 54.4 kg (120 lb)    LMP  (LMP Unknown)    SpO2 97%    Breastfeeding No    BMI 21.95 kg/m2       Temp:  [97.2 °F (36.2 °C)-97.9 °F (36.6 °C)]   Pulse:  []   Resp:  [16-18]   BP: (111-129)/(63-75)   SpO2:  [92 %-100 %]     I & O (Last 24H):    Intake/Output Summary (Last 24 hours) at 01/18/17 1046  Last data filed at 01/18/17 0000   Gross per 24 hour   Intake              240 ml   Output                0 ml   Net              240 ml       Physical Exam:  General appearance: elderly/frail but NAD/confused this a.m.  Eyes:  Conjunctivae/corneas clear. PERRL.  Neck:  +JVD  Lungs: Normal respiratory effort,   Few atelectic sounds.   Heart: Irr/Regular rate and rhythm, S1, S2 normal, no murmur, rub or donis.  Abdomen: Soft, non-tender non-distended; bowel sounds normal; no masses,  no organomegaly  Extremities: No cyanosis or clubbing. No edema.    Skin: Skin color, texture, turgor normal. No rashes or lesions  Neurologic: Normal strength and tone. No focal numbness or weakness   Psych: Confused    Laboratory Data:    Recent Labs  Lab 01/18/17  0432   WBC 6.36 "   RBC 3.55*   HGB 11.6*   HCT 34.4*   *   MCV 97   MCH 32.7*   MCHC 33.7       BMP:     Recent Labs  Lab 01/17/17 0438 01/18/17 0432   * 183*   * 135*   K 3.4* 3.4*    104   CO2 17* 17*   BUN 80* 81*   CREATININE 2.8* 2.5*   CALCIUM 8.7 8.7   MG 2.2  --      Lab Results   Component Value Date    CALCIUM 8.7 01/18/2017    PHOS 4.7 (H) 10/31/2016       BNP    Recent Labs  Lab 01/17/17 0438   BNP 3654*           Medications:  Medication list was reviewed and changes noted under Assessment/Plan.    Diagnostic Results:        ASSESSMENT/PLAN:     1. Resolving nonoliguric KIRILL on CKD IIIb-IV with baseline creatinine 2.2 likely secondary to decompensated heart failure? Vs over diuresis (N 17.9, N 18.4): No rhabdo. No Ace or ARB at this time. Last echo with NL EF/sysytolic fnc. Hold diuretics for now since renal fxn worsening but will likely need low-dose Lasix at discharge.  Follow trends closely. Renally dose meds, avoid nephrotoxins, and monitor I/O's closely.  2. Shortness of breath likely from decompensated heart failure (I 50.32): further recommendations from cardiology.  No complaints of shortness of breath ×48 hours.  3. Recent fall (W 19.XXX a): CT neg.   4. Chronic metabolic acidosis/RTA (E 87.2, N25.89): Continue oral bicarbonate and follow trends. Check lactic acid level.  5. Mild hypokalemia (E87.6):  Replace and mag wnl.   6. DM (E11.9):  januvia and ada diet  7. Disposition: Skilled nursing facility  8. DO NOT RESUSCITATE(Z66)  9. Altered mental status consistent with sundowners: Try to keep awake during the day.  Avoid any pain medicines or anxiolytics.

## 2017-01-18 NOTE — PLAN OF CARE
01/18/17 1225   Medicare Message   Important Message from Medicare regarding Discharge Appeal Rights Given to patient/caregiver;Explained to patient/caregiver;Signed/date by patient/caregiver   Date IMM was signed 01/18/17   Time IMM was signed 1022

## 2017-01-18 NOTE — PLAN OF CARE
D/C planning:    Plan of care update: LOCET completed at 12:40pm for SNF placement, writer spoke with Bernard. PASR to be faxed for completion.     Family prefers [facility] [place].    CM to follow closely and remain supportive.

## 2017-01-18 NOTE — PLAN OF CARE
Problem: Patient Care Overview  Goal: Plan of Care Review  Outcome: Ongoing (interventions implemented as appropriate)  Patient on room air; no change in therapy.

## 2017-01-18 NOTE — PLAN OF CARE
Problem: Patient Care Overview  Goal: Plan of Care Review  Outcome: Ongoing (interventions implemented as appropriate)  Resting in a low lying bed on 2 L of oxygen via nc.  At the onset of the shift the pt was lucid and did not remember any of the incidents from last night.  VSS.  Afebrile.  After her daughters left, and they turned out the lights, the patient could not be reoriented.  She became extremely confused and was speaking about her mother.  She kept asking about her mother falling while the RN was giving her her night time meds in pudding.  When taking VS during the night, she fights to keep her arm at her side.  She does not understand anything that the RN is telling her. VSS.  Afebrile.  Telemetry is ongoing.  (A fib)  Pt had a run of VTach early in the shift.  No c/o chest pain and she was asymptomatic.  Repositions herself in bed independently.  Incontinent to B and B.  No falls or injuries.  Safety precautions ongoing and call light within reach.  Will continue to monitor.

## 2017-01-18 NOTE — PT/OT/SLP EVAL
Occupational Therapy  Evaluation/Treatment    Sho Valencia   MRN: [id number]   Admitting Diagnosis: Shortness of breath    OT Date of Treatment: 01/18/17   OT Start Time: 0833  OT Stop Time: 0914  OT Total Time (min): 41 min    Billable Minutes:  Evaluation 24  Self Care/Home Management 17    Diagnosis: Shortness of breath     Past Medical History   Diagnosis Date    Anticoagulant long-term use     Atrial fibrillation     CHF (congestive heart failure)     CKD (chronic kidney disease), stage III     COPD (chronic obstructive pulmonary disease)     Coronary artery disease     Diabetes mellitus     Encounter for blood transfusion     Hypertension     Hypoxemia 11/2/2016 11/1/2016: Diagnosed. O2 Sat: Rest 92%. Ambulation 87%.    Osteoarthritis     Pneumonia     Stroke      2008    Vitamin D deficiency       Past Surgical History   Procedure Laterality Date    Rotator cuff repair      Hysterectomy      Total knee arthroplasty      Appendectomy      Eye surgery       cataracts removed from both eyes    Joint replacement       knee replacements       Referring physician: AMIRAH Santos  Date referred to OT: 1/17/19    General Precautions: Standard, fall, diabetic  Orthopedic Precautions: N/A  Braces: N/A    Do you have any cultural, spiritual, Yazidism conflicts, given your current situation?: None     Patient History:  Living Environment  Lives With: spouse  Living Arrangements: house (2 story house)  Home Accessibility: stairs within home, stairs to enter home (lift chair to 2nd floor)  Home Layout: Bathroom on 2nd floor  Stair Railings at Home: outside, present on right side (no HR at entry)  Transportation Available: family or friend will provide  Living Environment Comment: The patient was ambulating with rollator and SBA self-care with a recent decline (attendant care 4 hurs/day)  Equipment Currently Used at Home: rollator, wheelchair, shower chair, bedside commode    Prior level of  "function:   Bed Mobility/Transfers: needs device (rollator)  Grooming: independent  Bathing: needs device and assist (Max A -sitting with shower chair)  Upper Body Dressing: independent  Lower Body Dressing: independent  Toileting: independent  Home Management Skills: unable to perform  Driving License: No  Mode of Transportation: Family  Leisure and Hobbies: beauty parlor  IADL Comments: ambulatory with rollator, SBA self-care (Max A bathing)     Dominant hand: right    Subjective:  The patient was found supine in bed needing encouragement to participate in OT session.\    Chief Complaint: "want to stay in bed"  Patient/Family stated goals: None Reported    Pain Ratin/10   Pain Rating Post-Intervention: 0/10     Pt got SOB when sitting EOB, wanted to go back to bed  Sitting EOB SpO2 RA 81%     Sitting EOB Sp02 87-88% 2L 02 (Sp02 did not improve with increase in 02)    Objective:  Patient found with: telemetry, peripheral IV, vision impaired, [place]    Cognitive Exam:  Oriented to: Person general place and situation  Follows Commands/attention: Follows one-step commands  Communication: clear/fluent  Memory:  Impaired STM and Poor immediate recall  Safety awareness/insight to disability: impaired  Coping skills/emotional control: anxiety, resistant to do tasks OOB    Visual/perceptual:  Impaired  acuity and glasses and hearing aides    Physical Exam:  Postural examination/scapula alignment: No postural abnormalities identified  Skin integrity: extensive echymosis of R side of face and echymosis noted to R lateral malleoli   Edema: None noted     Sensation:   Intact  For light touch both hands  Upper Extremity Range of Motion:  Right Upper Extremity: WFL  Left Upper Extremity: WFL    Upper Extremity Strength:  Right Upper Extremity: 3/5  Left Upper Extremity: 2/5 arm, 3/5 elbow-forearm   Strength: fair both hands    Fine motor coordination:   Fair (impaired dexterity) both hands    Gross motor coordination: " "Good- sitting balance EOB, poor LE standing (leanded on bed not able to stand erect), not able to ambulate     Functional Mobility:  Bed Mobility:  Scooting/Bridging: Minimum Assistance  Supine to Sit: Minimum Assistance  Sit to Supine: Stand by Assistance    Transfers:   Mod A sit><stand (fear of standing)    Functional Ambulation: none    Activities of Daily Living:  Feeding Level of Assistance: Modified independent (bed level)  UE Dressing Level of Assistance: Maximum assistance (don hospital gown as robe sitting EOB)  LE Dressing Level of Assistance: Moderate assistance (doff/don socks bed level)  Grooming Position: other (bed level)  Grooming Level of Assistance: Stand by assistance (wash hands, repeat VQ)  Toileting Where Assessed: Bed level (pt ansious wanted bed pan not willing to attempt BSC transfer for toileting)  Toileting Level of Assistance: Minimum assistance (bed pan placement and hygiene)    Balance:   Static Sit: GOOD-: Takes MODERATE challenges from all directions but inconsistently  Dynamic Sit: FAIR+: Maintains balance through MINIMAL excursions of active trunk motion  Static Stand: 0: Needs MAXIMAL assist to maintain   Dynamic stand: NT    Therapeutic Activities and Exercises:  Bed mobility, anxiety management    AM-PAC 6 CLICK ADL  How much help from another person does this patient currently need?  1 = Unable, Total/Dependent Assistance  2 = A lot, Maximum/Moderate Assistance  3 = A little, Minimum/Contact Guard/Supervision  4 = None, Modified [place]/Independent    Putting on and taking off regular lower body clothing? : 2  Bathing (including washing, rinsing, drying)?: 2  Toileting, which includes using toilet, bedpan, or urinal? : 3  Putting on and taking off regular upper body clothing?: 2  Taking care of personal grooming such as brushing teeth?: 3  Eating meals?: 4  Total Score: 16    AM-PAC Raw Score CMS "G-Code Modifier Level of Impairment Assistance   6 % Total / Unable "   7 - 9 CM 80 - 100% Maximal Assist   10 - 14 CL 60 - 80% Moderate Assist   15 - 19 CK 40 - 60% Moderate Assist   20 - 22 CJ 20 - 40% Minimal Assist   23 CI 1-20% SBA / CGA   24 CH 0% Independent/ Mod I       Patient left supine with all lines intact, call button in reach, bed alarm on and nurse notified pt status and drop in SpO2 in session    Assessment:  Sho Valencia is a 83 y.o. female with a medical diagnosis of Shortness of breath and presents withanxiety, impaired memory, impaired LE balance, safety contributed to patient difficulty performing mobility and self-care tasks.  OT treatment is needed to maximize function while in the hospital. .    Rehab identified problem list/impairments: Rehab identified problem list/impairments:  (Functional Performance Components Effecting Function: anxiety, impaired memory, impaired LE balance, safety)    Rehab potential is good.    Activity tolerance: Fair    Discharge recommendations: Discharge Facility/Level Of Care Needs: nursing facility, skilled     Barriers to discharge: Barriers to Discharge: Inaccessible home environment, Decreased caregiver support    Equipment recommendations: other (see comments) (RW if not owned by patient)     GOALS:   Occupational Therapy Goals        Problem: Occupational Therapy Goal    Goal Priority Disciplines Outcome Interventions   Occupational Therapy Goal     OT, PT/OT Ongoing (interventions implemented as appropriate)    Description:  Goals to be met by 2/6/18  1. Mod A BSC transfer  2. Min A UBD  3. Maximize UE strength for mobility  4.Complete 30 minute session without refusal to attempt tasks              PLAN:  Patient to be seen 6 x/week to address the above listed problems via self-care/home management, therapeutic activities, therapeutic exercises  Plan of Care expires: 02/08/17  Plan of Care reviewed with: patient         MILTON Garcia  01/18/2017

## 2017-01-19 NOTE — PT/OT/SLP PROGRESS
"Physical Therapy  Treatment    Sho Valencia   MRN: [id number]   Admitting Diagnosis: Shortness of breath    PT Received On: 01/19/17  PT Start Time: 1011     PT Stop Time: 1055    PT Total Time (min): 44 min       Billable Minutes:  Gait Training9 and Therapeutic Activity 35    Treatment Type: Treatment  PT/PTA: PTA     PTA Visit Number: 1       General Precautions: Standard, fall, diabetic  Orthopedic Precautions: N/A   Braces: N/A    Do you have any cultural, spiritual, Gnosticist conflicts, given your current situation?: None specified    Subjective:  Communicated with nurse prior to session. Pt. Stated " I told you I don't want to get up I'm weak" Daughter encouraged patient to get out of bed     Pain Rating:  (complained of hurting but unable to quantify )     Objective:   Patient found with: peripheral IV, telemetry supine in bed     Functional Mobility:  Bed Mobility:   Scooting/Bridging: Moderate Assistance (secondary to patient resisting and fearful of falling)  Supine to Sit: Stand by Assistance increase time to perform and encouragement from daughter and therapist    Transfers:  Sit to stand from bed X 2 trials from bedside commode with RW with Moderate A for hip elevation and feet sliding out (patient was fearful of falling and required tactile cues and encouragement.   SPT from bed to commode with moderate A with RW patient let go of walker and required assistance to weight shifting onto commode     Gait: patient was followed with bedside chair secondary to resisting letting go of walker required maximum encouragement from daughter and therapist to ambulate. Patient became agitated and sat back into chair.   Gait Distance: 10 feet   Assistance 1: Minimum assistance  Gait Assistive Device: Rolling walker  Gait Pattern: reciprocal  Gait Deviation(s): decreased deb, decreased step length, decreased weight-shifting ability    Therapeutic Activities and Exercises:  Patient required total A for " cleaning and moderate A for balance in standing to [place]/doff brief      AM-PAC 6 CLICK MOBILITY  How much help from another person does this patient currently need?   1 = Unable, Total/Dependent Assistance  2 = A lot, Maximum/Moderate Assistance  3 = A little, Minimum/Contact Guard/Supervision  4 = None, Modified [place]/Independent    Turning over in bed (including adjusting bedclothes, sheets and blankets)?: 3  Sitting down on and standing up from a chair with arms (e.g., wheelchair, bedside commode, etc.): 3  Moving from lying on back to sitting on the side of the bed?: 3  Moving to and from a bed to a chair (including a wheelchair)?: 2  Need to walk in hospital room?: 3  Climbing 3-5 steps with a railing?: 1  Total Score: 15    AM-PAC Raw Score CMS G-Code Modifier Level of Impairment Assistance   6 % Total / Unable   7 - 9 CM 80 - 100% Maximal Assist   10 - 14 CL 60 - 80% Moderate Assist   15 - 19 CK 40 - 60% Moderate Assist   20 - 22 CJ 20 - 40% Minimal Assist   23 CI 1-20% SBA / CGA   24 CH 0% Independent/ Mod I     Patient left up in chair with all lines intact, call button in reach and nurse notified.    Assessment:  Sho Valencia is a 83 y.o. female with a medical diagnosis of Shortness of breath and presents with patient required encouragement to participate and limited secondary to dementia.  Patient will cont. To benefit from skilled PT services to improve strength, endurance and functional mobility.     Rehab identified problem list/impairments: Rehab identified problem list/impairments: weakness, impaired endurance, gait instability, impaired functional mobilty, impaired self care skills, impaired balance, impaired cognition, decreased safety awareness, impaired skin    Rehab potential is fair.    Activity tolerance: Fair    Discharge recommendations: Discharge Facility/Level Of Care Needs: nursing facility, skilled     Barriers to discharge: Barriers to Discharge: Inaccessible home  environment, Decreased caregiver support    Equipment recommendations: Equipment Needed After Discharge:  (TBD)     GOALS:   Physical Therapy Goals        Problem: Physical Therapy Goal    Goal Priority Disciplines Outcome Goal Variances Interventions   Physical Therapy Goal     PT/OT, PT Ongoing (interventions implemented as appropriate)     Description:  Goals to be met by: 17     Patient will increase functional independence with mobility by performin. Supine to sit with SBA.   2. Sit to supine with SBA.   3. Sit<>stand transfer with min A.   4. Gait x 25' feet with Min A using rolling walker.               PLAN:    Patient to be seen 6 x/week  to address the above listed problems via gait training, therapeutic activities, therapeutic exercises, neuromuscular re-education  Plan of Care expires: 17  Plan of Care reviewed with: patient, daughter         Lorri Kumar, PTA  2017

## 2017-01-19 NOTE — PLAN OF CARE
Discharge Planning:  Patient admitted on 1-15-17  Current dispo -SNF denial (currently in appeal with N)  Patient and family are aware of the appeal  Staci (dtr.) at the bedside  Staci is very upset about this denial  Supportive listening with Staci per delivery of this unfortunate message.    Case mgt also spoke with two [facility] representatives.  Writer spoke with Antonietta ALANIZ and Jordana of [facility].  Expedited appeal number is [phone].  Writer also spoke with Dr Ezequiel Pacheco's nurse.  Writer also spoke with charge nurse and bedside nurse.    Discussed care plan with treatment team.  Discussed care plan with the attending, Dr Nieves and Fran JONES    Case mgt to follow closely.

## 2017-01-19 NOTE — PLAN OF CARE
Problem: Physical Therapy Goal  Goal: Physical Therapy Goal  Goals to be met by: 17     Patient will increase functional independence with mobility by performin. Supine to sit with SBA.   2. Sit to supine with SBA.   3. Sit<>stand transfer with min A.   4. Gait x 25 feet with Min A using rolling walker.   Outcome: Ongoing (interventions implemented as appropriate)     Min to moderate A at this time with mobility. Limited with progression 2* dementia

## 2017-01-19 NOTE — DISCHARGE SUMMARY
[facility]  Cardiology  Discharge Summary      Patient Name: Sho Valencia  MRN: [id number]  Admission Date: 1/15/2017  Hospital Length of Stay: 4 days  Discharge Date and Time: 1/19/2017 10:41 AM  Attending Physician: Ethan Nieves MD  Discharging Provider: Ethan Nieves MD  Primary Care Physician: Yuri Villafuerte MD    HPI:     Sho Valencia is a 83 y.o. female patient with hypertension and diabetes mellitus type 2. She has usually well compensated diastolic heart failure. She has chronic atrial fibrillation with a [id number] score of 8 ([id number]) having been on warfarin until 2016 when she was changed over to apixiban. 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 She presented with diastolic heart failure in 10/2016 and was diuresed with furosemide 40 mg ivp Q12. With that her dyspnea improved and the leg edema resolved. Her CKD had worsened and her DM was actually well controlled off both Januvia and insulin during her stay. Her O2 was low with ambulation after she completed diuresis why home O2 was arranged for. Her VRR was somewhat high on follow up and the dose of metoprolol was increased.     She recently had a fall at home when she hit the floor. The details of the fall are quite unclear. She presents with increasing dyspnea to the ER. She was admitted.     * No surgery found *     Indwelling Lines/Drains at time of discharge:  Lines/Drains/Airways          No matching active lines, drains, or airways          Hospital Course:      CXR was essentially clear  On admission and with a rise in BUN/crea the diuretics were stopped. With a fall, ecchymosis, some dementia and worsening renal function it was decided to stop the anticoagulation. She was quite confused during her stay and the family decide on D/C to SNF as the next step before a more definite decision will be made about her living arrangement. A DNR was placed as per  family request.    Assessment and Plan:    1. Heart Failure, Diastolic, Chronic              6/16/2016: Echo: Normal left ventricular size with hyperdynamic systolic function. Moderately dilated LA. Aortic valve sclerosis.              Was on furosemide 40 mg Q24.              Overall well compensated.              On furosemide 20 mg Q24 on discharge.     2. Atrial Fibrillation              2010: Diagnosed with chronic atrial fibrillation.                [id number]= 8 ([id number]).              11/28/2016: Holter: Atrial fibrillation 87 () bpm. Rare VPCs.                          On metoprolol 100 mg Q24.              Rate appears well controlled.      3. Chronic Anticoagulation              2010: Diagnosed with chronic atrial fibrillation.                [id number]= 8 ([id number]).              2010: Began warfarin.              2016: Began apixiban.              Was on apixiban 2.5 mg Q12.              With worsening CKD, fall and worsening fragility do mnot believe benefit with anticoagulation outweigh risk even though [id number] is quite high.              Stopped apixiban.              On aspirin 81 mg Q24.     4. History of Cerebrovascular Accident            2007: Diagnosed with CVA. Confusion.     5. Hypertension            2000: Diagnosed.            Was on metoprolol 100 mg Q24 and furosemide 20 mg Q24.            Appears well controlled.     6. Diabetes Mellitus, Type 2            2000: Diagnosed. Complications: Retinopathy & CKD. Medications: Oral agent.            On Januvia 25 mg Q24.            Fran Chaidez.     7. Chronic Kidney Disease, Stage 4            6/6/2016: BUN/crea 35/1.6. CrCl 30.            10/29/2016: BUN/crea 56/2.0. CrCl 23.             Now further worsening.             Dr. Gerber Jorge Jr..             Dr. Celine Contreras.     8. Chronic Obstructive Pulmonary Disease             Hypoxemia.             Resting 92% down to 87% with activities              On home O2.     9.  Weakness             Rolling walker, wheelchair and PT.     10. Primary Care             Dr. Yuri Villafuerte.     11. Disposition                        1/19/2017: Discussed with family. To SNF but long term appears to need NH unless 24 hour sitters can be arranged.      Consults:   Consults         Status Ordering Provider     Inpatient consult to Nephrology  Once     Provider:  Celine Contreras MD    Completed ELLIE FUNG     Inpatient consult to Social Work/Case Management  Once     Provider:  (Not yet assigned)    Completed ROZINA PAREDES          Significant Diagnostic Studies: Labs:   BMP:   Recent Labs  Lab 01/18/17  0432 01/19/17  0426   * 166*  166*   * 134*  134*   K 3.4* 4.0  4.0    105  105   CO2 17* 17*  17*   BUN 81* 75*  75*   CREATININE 2.5* 2.3*  2.3*   CALCIUM 8.7 8.8  8.8   , CMP   Recent Labs  Lab 01/18/17  0432 01/19/17  0426   * 134*  134*   K 3.4* 4.0  4.0    105  105   CO2 17* 17*  17*   * 166*  166*   BUN 81* 75*  75*   CREATININE 2.5* 2.3*  2.3*   CALCIUM 8.7 8.8  8.8   ANIONGAP 14 12  12   ESTGFRAFRICA 20* 22*  22*   EGFRNONAA 17* 19*  19*    and CBC   Recent Labs  Lab 01/18/17  0432 01/19/17  0426   WBC 6.36 8.07  8.07   HGB 11.6* 11.6*  11.6*   HCT 34.4* 34.6*  34.6*   * 143*  143*     Radiology: X-Ray: CXR: X-Ray Chest PA and Lateral (CXR): No results found for this visit on 01/15/17.    Pending Diagnostic Studies:     None          Final Active Diagnoses:    Diagnosis Date Noted POA    PRINCIPAL PROBLEM:  Shortness of breath [R06.02] 06/05/2016 Yes    DNR (do not resuscitate) [Z66] 01/18/2017 No    Heart failure, diastolic, chronic [I50.32] 12/16/2013 Yes    Chronic anticoagulation [Z79.01] 06/05/2016 Not Applicable    Acute renal failure [N17.9] 06/05/2013 Yes    Atrial fibrillation [I48.91] 04/03/2013 Yes    Hypoxemia [R09.02] 11/02/2016 Yes    Hypertension [I10] 12/16/2013 Yes    Chronic kidney disease,  "stage IV (severe) [N18.4] 12/16/2013 Yes    History of cerebrovascular accident [Z86.73] 07/10/2013 Not Applicable    Diabetes mellitus, type 2 [E11.9] 04/03/2013 Yes      Problems Resolved During this Admission:    Diagnosis Date Noted Date Resolved POA       Discharged Condition: fair    Disposition: Home or Self Care    Follow Up:  Follow-up Information     Follow up with Yuri Villafuerte MD. Schedule an appointment as soon as possible for a visit in 2 weeks.    Specialty:  Internal Medicine    Contact information:    [address] [place], [place] [id number] [phone]          Follow up with Ethan Nieves MD. Schedule an appointment as soon as possible for a visit in 3 weeks.    Specialty:  Cardiology    Contact information:    [facility] [address] [phone]          Patient Instructions:     COMMODE FOR HOME USE   Order Specific Question Answer Comments   Type: Standard    Height: 5' 2" (1.575 m)    Weight: 54.4 kg (120 lb)    Does patient have medical equipment at home? walker, rolling    Does patient have medical equipment at home? wheelchair    Length of need (1-99 months): 99      Diet general       Medications:  Reconciled Home Medications:   Current Discharge Medication List      START taking these medications    Details   aspirin (ECOTRIN) 81 MG EC tablet Take 1 tablet (81 mg total) by mouth once daily.  Qty: 90 tablet, Refills: 3         CONTINUE these medications which have CHANGED    Details   furosemide (LASIX) 20 MG tablet Take 1 tablet (20 mg total) by mouth once daily.  Qty: 90 tablet, Refills: 3    Associated Diagnoses: Heart failure, diastolic, chronic      SITagliptan (JANUVIA) 25 MG Tab Take 1 tablet (25 mg total) by mouth once daily.  Qty: 30 tablet, Refills: 3         CONTINUE these medications which have NOT CHANGED    Details   allopurinol (ZYLOPRIM) 100 MG tablet Take 1 tablet (100 mg total) by mouth once daily.  Qty: 90 tablet, Refills: 3      citalopram " (CELEXA) 20 MG tablet Take 20 mg by mouth 2 (two) times daily.       divalproex (DEPAKOTE) 250 MG EC tablet 2 (two) times daily.       metoprolol succinate (TOPROL-XL) 100 MG 24 hr tablet Take 1 tablet (100 mg total) by mouth once daily.  Qty: 90 tablet, Refills: 3    Associated Diagnoses: Chronic atrial fibrillation; Essential hypertension      mirabegron 50 mg Tb24 Take 1 tablet (50 mg total) by mouth once daily.  Qty: 30 tablet, Refills: 11      ofloxacin (OCUFLOX) 0.3 % ophthalmic solution INSTILL 5 DROPS INTO LEFT EYE 3 TIMES A DAY FOR 10 DAYS  Refills: 0      sodium bicarbonate 325 MG tablet Take 325 mg by mouth 3 (three) times daily.   Refills: 3      vitamin D 1000 units Tab Take 185 mg by mouth once daily.         STOP taking these medications       ELIQUIS 2.5 mg Tab Comments:   Reason for Stopping:         cephALEXin (KEFLEX) 500 MG capsule Comments:   Reason for Stopping:               Time spent on the discharge of patient: 60 minutes    Ethan Nieves MD  Cardiology  [facility]

## 2017-01-19 NOTE — PROGRESS NOTES
[facility]  Cardiology  Progress Note    Patient Name: Sho Valencia  MRN: [id number]  Admission Date: 1/15/2017  Hospital Length of Stay: 3 days  Code Status: DNR   Attending Physician: Ethan Nieves MD   Primary Care Physician: Yuri Villafuerte MD  Expected Discharge Date:   Principal Problem:Shortness of breath    Subjective:     Brief HPI:    Sho Valencia is a 83 y.o. female patient with hypertension and diabetes mellitus type 2. She has usually well compensated diastolic heart failure. She has chronic atrial fibrillation with a [id number] score of 8 ([id number]) having been on warfarin until 2016 when she was changed over to apixiban. 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 She presented with diastolic heart failure in 10/2016 and was diuresed with furosemide 40 mg ivp Q12. With that her dyspnea improved and the leg edema resolved. Her CKD had worsened and her DM was actually well controlled off both Januvia and insulin during her stay. Her O2 was low with ambulation after she completed diuresis why home O2 was arranged for. Her VRR was somewhat high on follow up and the dose of metoprolol was increased.     She recently had a fall at home when she hit the floor. The details of the fall are quite unclear. She now presents with increasing dyspnea to the ER. She was admitted.    Hospital Course:     CXR was essentially clear why diuretics were stopped.    Interval History:     Confused. Comfortable supine.    Family wants DNR.    Review of Systems   Constitutional: Positive for fatigue. Negative for chills and fever.   HENT: Positive for facial swelling.    Eyes: Negative for photophobia and visual disturbance.   Respiratory: Negative for chest tightness and shortness of breath.    Cardiovascular: Negative for chest pain, palpitations and leg swelling.   Gastrointestinal: Negative for abdominal distention, abdominal pain,  diarrhea and nausea.   Endocrine: Negative for cold intolerance and heat intolerance.   Genitourinary: Negative for dysuria, frequency and hematuria.   Musculoskeletal: Positive for arthralgias and gait problem. Negative for joint swelling and myalgias.   Skin: Negative for rash.   Neurological: Positive for weakness. Negative for light-headedness and headaches.   Hematological: Negative for adenopathy. Does not bruise/bleed easily.   Psychiatric/Behavioral: Positive for confusion. The patient is not nervous/anxious.      Objective:     Vital Signs (Most Recent):  Temp: 98 °F (36.7 °C) (01/18/17 1600)  Pulse: 98 (01/18/17 1600)  Resp: 16 (01/18/17 1600)  BP: 102/63 (01/18/17 1600)  SpO2: 99 % (01/18/17 1600) Vital Signs (24h Range):  Temp:  [97 °F (36.1 °C)-98 °F (36.7 °C)] 98 °F (36.7 °C)  Pulse:  [] 98  Resp:  [16] 16  SpO2:  [91 %-99 %] 99 %  BP: (100-129)/(63-75) 102/63     Weight: 54.4 kg (120 lb)  Body mass index is 21.95 kg/(m^2).    SpO2: 99 %  O2 Device (Oxygen Therapy): room air      Intake/Output Summary (Last 24 hours) at 01/18/17 1920  Last data filed at 01/18/17 0000   Gross per 24 hour   Intake              120 ml   Output                0 ml   Net              120 ml       Lines/Drains/Airways     Peripheral Intravenous Line                 Peripheral IV - Single Lumen 01/15/17 1228 Left Antecubital 2 days                Physical Exam   Constitutional: She appears well-developed. She appears ill. No distress.   HENT:   Head: Normocephalic and atraumatic.   Nose: Nose normal.   Eyes: Right eye exhibits no discharge. Left eye exhibits no discharge. Right conjunctiva is not injected. Left conjunctiva is not injected. Right pupil is round. Left pupil is round. Pupils are equal.   Neck: No JVD present.   Cardiovascular: S1 normal and S2 normal.  An irregularly irregular rhythm present.   Murmur heard.   Systolic murmur is present    No diastolic murmur is present   Pulmonary/Chest: Effort normal and  breath sounds normal.   Abdominal: Soft. Normal appearance. There is no tenderness.   Musculoskeletal:        Left ankle: She exhibits no swelling.   Lymphadenopathy:        Head (right side): No submandibular adenopathy present.        Head (left side): No submandibular adenopathy present.     She has no cervical adenopathy.   Neurological: She is alert. She is disoriented. No cranial nerve deficit.   Skin: Skin is dry. Ecchymosis (right face) noted. No rash noted.   Psychiatric: Cognition and memory are impaired.     Current Medications:     allopurinol  100 mg Oral Daily    aspirin  81 mg Oral Daily    citalopram  20 mg Oral BID    divalproex  250 mg Oral BID    famotidine  20 mg Oral BID    heparin (porcine)  5,000 Units Subcutaneous Q12H    metoprolol succinate  100 mg Oral Daily    ofloxacin  5 drop Left Eye TID    senna-docusate 8.6-50 mg  1 tablet Oral BID    sodium bicarbonate  650 mg Oral BID    vitamin D  1,000 Units Oral Daily     Current Laboratory Results:    Recent Results (from the past 24 hour(s))   Basic metabolic panel    Collection Time: 01/18/17  4:32 AM   Result Value Ref Range    Sodium 135 (L) 136 - 145 mmol/L    Potassium 3.4 (L) 3.5 - 5.1 mmol/L    Chloride 104 95 - 110 mmol/L    CO2 17 (L) 23 - 29 mmol/L    Glucose 183 (H) 70 - 110 mg/dL    BUN, Bld 81 (H) 8 - 23 mg/dL    Creatinine 2.5 (H) 0.5 - 1.4 mg/dL    Calcium 8.7 8.7 - 10.5 mg/dL    Anion Gap 14 8 - 16 mmol/L    eGFR if African American 20 (A) >60 mL/min/1.73 m^2    eGFR if non African American 17 (A) >60 mL/min/1.73 m^2   CBC auto differential    Collection Time: 01/18/17  4:32 AM   Result Value Ref Range    WBC 6.36 3.90 - 12.70 K/uL    RBC 3.55 (L) 4.00 - 5.40 M/uL    Hemoglobin 11.6 (L) 12.0 - 16.0 g/dL    Hematocrit 34.4 (L) 37.0 - 48.5 %    MCV 97 82 - 98 fL    MCH 32.7 (H) 27.0 - 31.0 pg    MCHC 33.7 32.0 - 36.0 %    RDW 18.1 (H) 11.5 - 14.5 %    Platelets 144 (L) 150 - 350 K/uL    MPV 10.2 9.2 - 12.9 fL    Gran #  4.4 1.8 - 7.7 K/uL    Lymph # 0.8 (L) 1.0 - 4.8 K/uL    Mono # 0.9 0.3 - 1.0 K/uL    Eos # 0.2 0.0 - 0.5 K/uL    Baso # 0.03 0.00 - 0.20 K/uL    Gran% 69.8 38.0 - 73.0 %    Lymph% 12.4 (L) 18.0 - 48.0 %    Mono% 13.7 4.0 - 15.0 %    Eosinophil% 2.7 0.0 - 8.0 %    Basophil% 0.5 0.0 - 1.9 %    Differential Method Automated    Lactic acid, plasma    Collection Time: 01/18/17  4:32 AM   Result Value Ref Range    Lactate (Lactic Acid) 1.3 0.5 - 2.2 mmol/L     Current Imaging Results:    Imaging Results         CT Cervical Spine Without Contrast (Final result) Result time:  01/15/17 13:42:21    Final result by Shakila Arango MD (01/15/17 13:42:21)    Impression:      No acute fracture.       Electronically signed by: SHAKILA ARANGO MD  Date:     01/15/17  Time:    13:42     Narrative:    CT CERVICAL SPINE WITHOUT CONTRAST  CLINICAL INDICATION:  fall.    TECHNIQUE: 2mm axial images were acquired of the cervical spine without the administration of contrast. Coronal and sagittal reconstructions were performed.     COMPARISON: None      FINDINGS:    Spinal alignment is within normal limits. The vertebral body heights are adequately maintained. The intervertebral disk spaces are adequately maintained. There is no evidence of lytic or blastic lesions. There is no evidence of fracture or dislocation.      There is mild degenerative change of the lower cervical spine.    The remainder of the visualized osseous structures and soft tissues appear unremarkable.            CT Maxillofacial Without Contrast (Final result) Result time:  01/15/17 13:20:17    Final result by Shakila Arango MD (01/15/17 13:20:17)    Impression:     No acute fracture.      Electronically signed by: SHAKILA ARANGO MD  Date:     01/15/17  Time:    13:20     Narrative:      EXAM: Maxillofacial CT without contrast    CLINICAL INDICATION:  fall.    TECHNIQUE:  0.625  mm images were acquired through the paranasal sinuses from just above the  frontal sinus down to just below the mandible without contrast were acquired.    COMPARISON:None.    FINDINGS: The frontal sinuses, frontal ethmoid recesses, ethmoid sinuses, sphenoid sinuses, and maxillary sinuses are clear.  Ostiomeatal complexes are patent bilaterally.  The nasal bones are intact and the nasal septum is midline.  The orbits and orbital contents have normal appearance.    There is no evidence of acute fracture.            CT Head Without Contrast (Final result) Result time:  01/15/17 13:18:26    Final result by Shakila Arango MD (01/15/17 13:18:26)    Impression:     No significant change from previous exam with evidence of large remote right temporoparietal infarct and small left cerebellar remote infarct.      Electronically signed by: SHAKILA ARANGO MD  Date:     01/15/17  Time:    13:18     Narrative:      EXAM: Non-contrast head CT    CLINICAL INDICATION:  head injury.    TECHNIQUE: Routine cranial CT was acquired in the axial plane without contrast and coronal and sagittal reconstructions were performed.    COMPARISON:Prior from 12/16/13    FINDINGS: The brain parenchyma is of normal attenuation with no evidence of acute hemorrhage, mass lesion or new major vascular distribution infarct.  There is a large stable area of encephalomalacia in the right parietal temporal region and a smaller area in the left cerebellum consistent with remote infarcts. There are no extra-axial masses or fluid collections.  The ventricular system is of normal size for age and midline.     The visualized paranasal sinuses and mastoid air cells are clear.     There is no evidence of acute fracture.            X-Ray Chest 1 View (Final result) Result time:  01/15/17 12:48:06    Final result by Shakila Arango MD (01/15/17 12:48:06)    Impression:    Clear lungs.      Electronically signed by: SHAKILA ARANGO MD  Date:     01/15/17  Time:    12:48     Narrative:    EXAM:  AP CHEST RADIOGRAPH.      CLINICAL INDICATION:  Shortness of breath.    TECHNIQUE: Single AP view of the chest was obtained.     COMPARISON: Prior from 10/31/16    FINDINGS: The mediastinal structures are midline.  The cardiac silhouette is prominent but difficult to evaluate due to AP technique.  Is atherosclerotic calcification of the aortic arch. The lungs appear grossly clear.  No osseous abnormalities are identified.              Assessment and Plan:     Problem List:    Active Diagnoses:    Diagnosis Date Noted POA    PRINCIPAL PROBLEM:  Shortness of breath [R06.02] 06/05/2016 Yes    Heart failure, diastolic, chronic [I50.32] 12/16/2013 Yes    Chronic anticoagulation [Z79.01] 06/05/2016 Not Applicable    Acute renal failure [N17.9] 06/05/2013 Unknown    Atrial fibrillation [I48.91] 04/03/2013 Yes    Hypoxemia [R09.02] 11/02/2016 Yes    Hypertension [I10] 12/16/2013 Yes    Chronic kidney disease, stage IV (severe) [N18.4] 12/16/2013 Yes    History of cerebrovascular accident [Z86.73] 07/10/2013 Not Applicable    Diabetes mellitus, type 2 [E11.9] 04/03/2013 Yes    DNR (do not resuscitate) [Z66] 01/18/2017 No      Problems Resolved During this Admission:    Diagnosis Date Noted Date Resolved POA     Assessment and Plan:    1. Heart Failure, Diastolic, Chronic              6/16/2016: Echo: Normal left ventricular size with hyperdynamic systolic function. Moderately dilated LA. Aortic valve sclerosis.              Usually on furosemide 40 mg Q24.              Overall well compensated.   Plan to resume furosemide at a dose of 20 mg Q24 ion discharge.     2. Atrial Fibrillation              2010: Diagnosed with chronic atrial fibrillation.                [id number]= 8 ([id number]).              11/28/2016: Holter: Atrial fibrillation 87 () bpm. Rare VPCs.                          On metoprolol 100 mg Q24.              Rate appears well controlled.      3. Chronic Anticoagulation              2010: Diagnosed with chronic  atrial fibrillation.                [id number]= 8 ([id number]).              2010: Began warfarin.              2016: Began apixiban.              Was on apixiban 2.5 mg Q12.              With worsening CKD, fall and worsening fragility do mnot believe benefit with anticoagulation outweigh risk even though [id number] is quite high.              Stopped apixiban.               Aspirin 81 mg long term.     4. History of Cerebrovascular Accident            2007: Diagnosed with CVA. Confusion.     5. Hypertension            2000: Diagnosed.            Was on metoprolol 100 mg Q24 and furosemide 40 mg Q24.            Appears well controlled.     6. Diabetes Mellitus, Type 2            2000: Diagnosed. Complications: Retinopathy & CKD. Medications: Diet.            Does not appear to need insulin or Januvia.            Fran Chaidez.     7. Chronic Kidney Disease, Stage 4            6/6/2016: BUN/crea 35/1.6. CrCl 30.            10/29/2016: BUN/crea 56/2.0. CrCl 23.             Now further worsening.             Dr. Gerber Jorge Jr..             Dr. Celine Contreras.     8. Chronic Obstructive Pulmonary Disease             Hypoxemia.             Resting 92% down to 87% with activities              On home O2.     9. Weakness             Rolling walker and wheelchair.     10. Primary Care             Dr. Yuri Villafuerte.    11. Disposition   1/18/2017: Will discuss with family and decide on long term plan.    VTE Risk Mitigation         Ordered     heparin (porcine) injection 5,000 Units  Every 12 hours     Route:  Subcutaneous        01/17/17 0940     Medium Risk of VTE  Once      01/15/17 1740     Place sequential compression device  Until discontinued      01/15/17 1740          Anticipated Disposition: Home or Self Care    Discharge Needs: None unusual planned.    Ethan Nieves MD  Cardiology  [facility]

## 2017-01-19 NOTE — PROGRESS NOTES
[facility]  Cardiology  Progress Note    Patient Name: Sho Valencia  MRN: [id number]  Admission Date: 1/15/2017  Hospital Length of Stay: 4 days  Code Status: DNR   Attending Physician: Ethan Nieves MD   Primary Care Physician: Yuri Villafuerte MD  Expected Discharge Date:   Principal Problem:Shortness of breath    Subjective:     Brief HPI:    Sho Valencia is a 83 y.o. female patient with hypertension and diabetes mellitus type 2. She has usually well compensated diastolic heart failure. She has chronic atrial fibrillation with a [id number] score of 8 ([id number]) having been on warfarin until 2016 when she was changed over to apixiban. 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 She presented with diastolic heart failure in 10/2016 and was diuresed with furosemide 40 mg ivp Q12. With that her dyspnea improved and the leg edema resolved. Her CKD had worsened and her DM was actually well controlled off both Januvia and insulin during her stay. Her O2 was low with ambulation after she completed diuresis why home O2 was arranged for. Her VRR was somewhat high on follow up and the dose of metoprolol was increased.     She recently had a fall at home when she hit the floor. The details of the fall are quite unclear. She now presents with increasing dyspnea to the ER. She was admitted.    Hospital Course:     CXR was essentially clear why diuretics were stopped.    Interval History:     Confused. Comfortable supine.    Family wants DNR.    Review of Systems   Constitutional: Positive for fatigue. Negative for chills and fever.   HENT: Positive for facial swelling.    Eyes: Negative for photophobia and visual disturbance.   Respiratory: Negative for chest tightness and shortness of breath.    Cardiovascular: Negative for chest pain, palpitations and leg swelling.   Gastrointestinal: Negative for abdominal distention, abdominal pain,  diarrhea and nausea.   Endocrine: Negative for cold intolerance and heat intolerance.   Genitourinary: Negative for dysuria, frequency and hematuria.   Musculoskeletal: Positive for arthralgias and gait problem. Negative for joint swelling and myalgias.   Skin: Negative for rash.   Neurological: Positive for weakness. Negative for light-headedness and headaches.   Hematological: Negative for adenopathy. Does not bruise/bleed easily.   Psychiatric/Behavioral: Positive for confusion. The patient is not nervous/anxious.      Objective:     Vital Signs (Most Recent):  Temp: 97.7 °F (36.5 °C) (01/19/17 0802)  Pulse: 106 (01/19/17 0802)  Resp: 18 (01/19/17 0802)  BP: (!) 106/56 (01/19/17 0802)  SpO2: (!) 94 % (01/19/17 0802) Vital Signs (24h Range):  Temp:  [97 °F (36.1 °C)-98 °F (36.7 °C)] 97.7 °F (36.5 °C)  Pulse:  [] 106  Resp:  [16-18] 18  SpO2:  [91 %-99 %] 94 %  BP: (100-124)/(56-72) 106/56     Weight: 54.4 kg (120 lb)  Body mass index is 21.95 kg/(m^2).    SpO2: (!) 94 %  O2 Device (Oxygen Therapy): nasal cannula      Intake/Output Summary (Last 24 hours) at 01/19/17 1025  Last data filed at 01/18/17 2130   Gross per 24 hour   Intake              120 ml   Output                0 ml   Net              120 ml       Lines/Drains/Airways     Peripheral Intravenous Line                 Peripheral IV - Single Lumen 01/15/17 1228 Left Antecubital 2 days                Physical Exam   Constitutional: She appears well-developed. She appears ill. No distress.   HENT:   Head: Normocephalic and atraumatic.   Nose: Nose normal.   Eyes: Right eye exhibits no discharge. Left eye exhibits no discharge. Right conjunctiva is not injected. Left conjunctiva is not injected. Right pupil is round. Left pupil is round. Pupils are equal.   Neck: No JVD present.   Cardiovascular: Normal rate, S1 normal and S2 normal.  An irregularly irregular rhythm present.   Murmur heard.   Systolic murmur is present    No diastolic murmur is  present   Pulmonary/Chest: Effort normal and breath sounds normal.   Abdominal: Soft. Normal appearance. There is no tenderness.   Musculoskeletal:        Left ankle: She exhibits no swelling.   Lymphadenopathy:        Head (right side): No submandibular adenopathy present.        Head (left side): No submandibular adenopathy present.     She has no cervical adenopathy.   Neurological: She is alert. She is disoriented. No cranial nerve deficit.   Skin: Skin is dry. Ecchymosis (right face) noted. No rash noted.   Psychiatric: Cognition and memory are impaired.     Current Medications:     allopurinol  100 mg Oral Daily    aspirin  81 mg Oral Daily    citalopram  20 mg Oral BID    divalproex  250 mg Oral BID    famotidine  20 mg Oral BID    heparin (porcine)  5,000 Units Subcutaneous Q12H    metoprolol succinate  100 mg Oral Daily    ofloxacin  5 drop Left Eye TID    senna-docusate 8.6-50 mg  1 tablet Oral BID    sodium bicarbonate  650 mg Oral BID    vitamin D  1,000 Units Oral Daily     Current Laboratory Results:    Recent Results (from the past 24 hour(s))   Basic metabolic panel    Collection Time: 01/19/17  4:26 AM   Result Value Ref Range    Sodium 134 (L) 136 - 145 mmol/L    Potassium 4.0 3.5 - 5.1 mmol/L    Chloride 105 95 - 110 mmol/L    CO2 17 (L) 23 - 29 mmol/L    Glucose 166 (H) 70 - 110 mg/dL    BUN, Bld 75 (H) 8 - 23 mg/dL    Creatinine 2.3 (H) 0.5 - 1.4 mg/dL    Calcium 8.8 8.7 - 10.5 mg/dL    Anion Gap 12 8 - 16 mmol/L    eGFR if African American 22 (A) >60 mL/min/1.73 m^2    eGFR if non African American 19 (A) >60 mL/min/1.73 m^2   CBC auto differential    Collection Time: 01/19/17  4:26 AM   Result Value Ref Range    WBC 8.07 3.90 - 12.70 K/uL    RBC 3.52 (L) 4.00 - 5.40 M/uL    Hemoglobin 11.6 (L) 12.0 - 16.0 g/dL    Hematocrit 34.6 (L) 37.0 - 48.5 %    MCV 98 82 - 98 fL    MCH 33.0 (H) 27.0 - 31.0 pg    MCHC 33.5 32.0 - 36.0 %    RDW 18.4 (H) 11.5 - 14.5 %    Platelets 143 (L) 150 - 350  K/uL    MPV 10.1 9.2 - 12.9 fL    Gran # 5.5 1.8 - 7.7 K/uL    Lymph # 0.6 (L) 1.0 - 4.8 K/uL    Mono # 1.6 (H) 0.3 - 1.0 K/uL    Eos # 0.3 0.0 - 0.5 K/uL    Baso # 0.03 0.00 - 0.20 K/uL    Gran% 68.5 38.0 - 73.0 %    Lymph% 7.7 (L) 18.0 - 48.0 %    Mono% 19.2 (H) 4.0 - 15.0 %    Eosinophil% 3.1 0.0 - 8.0 %    Basophil% 0.4 0.0 - 1.9 %    Differential Method Automated    Basic metabolic panel    Collection Time: 01/19/17  4:26 AM   Result Value Ref Range    Sodium 134 (L) 136 - 145 mmol/L    Potassium 4.0 3.5 - 5.1 mmol/L    Chloride 105 95 - 110 mmol/L    CO2 17 (L) 23 - 29 mmol/L    Glucose 166 (H) 70 - 110 mg/dL    BUN, Bld 75 (H) 8 - 23 mg/dL    Creatinine 2.3 (H) 0.5 - 1.4 mg/dL    Calcium 8.8 8.7 - 10.5 mg/dL    Anion Gap 12 8 - 16 mmol/L    eGFR if African American 22 (A) >60 mL/min/1.73 m^2    eGFR if non African American 19 (A) >60 mL/min/1.73 m^2   CBC auto differential    Collection Time: 01/19/17  4:26 AM   Result Value Ref Range    WBC 8.07 3.90 - 12.70 K/uL    RBC 3.52 (L) 4.00 - 5.40 M/uL    Hemoglobin 11.6 (L) 12.0 - 16.0 g/dL    Hematocrit 34.6 (L) 37.0 - 48.5 %    MCV 98 82 - 98 fL    MCH 33.0 (H) 27.0 - 31.0 pg    MCHC 33.5 32.0 - 36.0 %    RDW 18.4 (H) 11.5 - 14.5 %    Platelets 143 (L) 150 - 350 K/uL    MPV 10.1 9.2 - 12.9 fL    Gran # 5.5 1.8 - 7.7 K/uL    Lymph # 0.6 (L) 1.0 - 4.8 K/uL    Mono # 1.6 (H) 0.3 - 1.0 K/uL    Eos # 0.3 0.0 - 0.5 K/uL    Baso # 0.03 0.00 - 0.20 K/uL    Gran% 68.5 38.0 - 73.0 %    Lymph% 7.7 (L) 18.0 - 48.0 %    Mono% 19.2 (H) 4.0 - 15.0 %    Eosinophil% 3.1 0.0 - 8.0 %    Basophil% 0.4 0.0 - 1.9 %    Differential Method Automated    Brain natriuretic peptide    Collection Time: 01/19/17  4:26 AM   Result Value Ref Range    BNP 3232 (H) 0 - 99 pg/mL     Current Imaging Results:    Imaging Results         CT Cervical Spine Without Contrast (Final result) Result time:  01/15/17 13:42:21    Final result by Shakila Barrett MD (01/15/17 13:42:21)    Impression:       No acute fracture.       Electronically signed by: SHAKILA ARANGO MD  Date:     01/15/17  Time:    13:42     Narrative:    CT CERVICAL SPINE WITHOUT CONTRAST  CLINICAL INDICATION:  fall.    TECHNIQUE: 2mm axial images were acquired of the cervical spine without the administration of contrast. Coronal and sagittal reconstructions were performed.     COMPARISON: None      FINDINGS:    Spinal alignment is within normal limits. The vertebral body heights are adequately maintained. The intervertebral disk spaces are adequately maintained. There is no evidence of lytic or blastic lesions. There is no evidence of fracture or dislocation.      There is mild degenerative change of the lower cervical spine.    The remainder of the visualized osseous structures and soft tissues appear unremarkable.            CT Maxillofacial Without Contrast (Final result) Result time:  01/15/17 13:20:17    Final result by Shakila Arango MD (01/15/17 13:20:17)    Impression:     No acute fracture.      Electronically signed by: SHAKILA ARANGO MD  Date:     01/15/17  Time:    13:20     Narrative:      EXAM: Maxillofacial CT without contrast    CLINICAL INDICATION:  fall.    TECHNIQUE:  0.625  mm images were acquired through the paranasal sinuses from just above the frontal sinus down to just below the mandible without contrast were acquired.    COMPARISON:None.    FINDINGS: The frontal sinuses, frontal ethmoid recesses, ethmoid sinuses, sphenoid sinuses, and maxillary sinuses are clear.  Ostiomeatal complexes are patent bilaterally.  The nasal bones are intact and the nasal septum is midline.  The orbits and orbital contents have normal appearance.    There is no evidence of acute fracture.            CT Head Without Contrast (Final result) Result time:  01/15/17 13:18:26    Final result by Shakila Arango MD (01/15/17 13:18:26)    Impression:     No significant change from previous exam with evidence of large remote right  temporoparietal infarct and small left cerebellar remote infarct.      Electronically signed by: SHAKILA ARANGO MD  Date:     01/15/17  Time:    13:18     Narrative:      EXAM: Non-contrast head CT    CLINICAL INDICATION:  head injury.    TECHNIQUE: Routine cranial CT was acquired in the axial plane without contrast and coronal and sagittal reconstructions were performed.    COMPARISON:Prior from 12/16/13    FINDINGS: The brain parenchyma is of normal attenuation with no evidence of acute hemorrhage, mass lesion or new major vascular distribution infarct.  There is a large stable area of encephalomalacia in the right parietal temporal region and a smaller area in the left cerebellum consistent with remote infarcts. There are no extra-axial masses or fluid collections.  The ventricular system is of normal size for age and midline.     The visualized paranasal sinuses and mastoid air cells are clear.     There is no evidence of acute fracture.            X-Ray Chest 1 View (Final result) Result time:  01/15/17 12:48:06    Final result by Shakila Arango MD (01/15/17 12:48:06)    Impression:    Clear lungs.      Electronically signed by: SHAKILA ARANGO MD  Date:     01/15/17  Time:    12:48     Narrative:    EXAM:  AP CHEST RADIOGRAPH.     CLINICAL INDICATION:  Shortness of breath.    TECHNIQUE: Single AP view of the chest was obtained.     COMPARISON: Prior from 10/31/16    FINDINGS: The mediastinal structures are midline.  The cardiac silhouette is prominent but difficult to evaluate due to AP technique.  Is atherosclerotic calcification of the aortic arch. The lungs appear grossly clear.  No osseous abnormalities are identified.              Assessment and Plan:     Problem List:    Active Diagnoses:    Diagnosis Date Noted POA    PRINCIPAL PROBLEM:  Shortness of breath [R06.02] 06/05/2016 Yes    Heart failure, diastolic, chronic [I50.32] 12/16/2013 Yes    Chronic anticoagulation [Z79.01] 06/05/2016 Not  Applicable    Acute renal failure [N17.9] 06/05/2013 Unknown    Atrial fibrillation [I48.91] 04/03/2013 Yes    Hypoxemia [R09.02] 11/02/2016 Yes    Hypertension [I10] 12/16/2013 Yes    Chronic kidney disease, stage IV (severe) [N18.4] 12/16/2013 Yes    History of cerebrovascular accident [Z86.73] 07/10/2013 Not Applicable    Diabetes mellitus, type 2 [E11.9] 04/03/2013 Yes    DNR (do not resuscitate) [Z66] 01/18/2017 No      Problems Resolved During this Admission:    Diagnosis Date Noted Date Resolved POA     Assessment and Plan:    1. Heart Failure, Diastolic, Chronic              6/16/2016: Echo: Normal left ventricular size with hyperdynamic systolic function. Moderately dilated LA. Aortic valve sclerosis.              Usually on furosemide 40 mg Q24.              Overall well compensated.   Plan to resume furosemide at a dose of 20 mg Q24 on discharge.     2. Atrial Fibrillation              2010: Diagnosed with chronic atrial fibrillation.                [id number]= 8 ([id number]).              11/28/2016: Holter: Atrial fibrillation 87 () bpm. Rare VPCs.                          On metoprolol 100 mg Q24.              Rate appears well controlled.      3. Chronic Anticoagulation              2010: Diagnosed with chronic atrial fibrillation.                [id number]= 8 ([id number]).              2010: Began warfarin.              2016: Began apixiban.              Was on apixiban 2.5 mg Q12.              With worsening CKD, fall and worsening fragility do mnot believe benefit with anticoagulation outweigh risk even though [id number] is quite high.              Stopped apixiban.              On aspirin 81 mg Q24.     4. History of Cerebrovascular Accident            2007: Diagnosed with CVA. Confusion.     5. Hypertension            2000: Diagnosed.            Was on metoprolol 100 mg Q24 and furosemide 20 mg Q24.            Appears well controlled.     6. Diabetes Mellitus, Type 2             2000: Diagnosed. Complications: Retinopathy & CKD. Medications: Oral agent.            On Januvia 25 mg Q24.            Fran Chaidez.     7. Chronic Kidney Disease, Stage 4            6/6/2016: BUN/crea 35/1.6. CrCl 30.            10/29/2016: BUN/crea 56/2.0. CrCl 23.             Now further worsening.             Dr. Gerber Jorge Jr..             Dr. Celine Contreras.     8. Chronic Obstructive Pulmonary Disease             Hypoxemia.             Resting 92% down to 87% with activities              On home O2.     9. Weakness             Rolling walker and wheelchair.     10. Primary Care             Dr. Yuri Villafuerte.    11. Disposition   1/19/2017: Discussed with family. To SNF but long term appears to need NH unless 24 hour sitters can be arranged.    VTE Risk Mitigation         Ordered     heparin (porcine) injection 5,000 Units  Every 12 hours     Route:  Subcutaneous        01/17/17 0940     Medium Risk of VTE  Once      01/15/17 1740     Place sequential compression device  Until discontinued      01/15/17 1740          Anticipated Disposition: Home or Self Care    Discharge Needs: None unusual planned.    Ethan Nieves MD  Cardiology  [facility]

## 2017-01-19 NOTE — PLAN OF CARE
Problem: Patient Care Overview  Goal: Plan of Care Review  Outcome: Ongoing (interventions implemented as appropriate)  Patient on oxygen; no change in therapy.

## 2017-01-19 NOTE — PLAN OF CARE
Problem: Patient Care Overview  Goal: Plan of Care Review  Outcome: Ongoing (interventions implemented as appropriate)  Resting comfortably in a low lying bed on 2 L of oxygen via nc.  Oxygen tubing extension used to accommodate pt as she is placed on the BSC.  VSS.  Afebrile.  Telemetry ongoing. (A fib)  Status changed to DNR.  Confused and cannot be reoriented.  Will take medication one pill at a time and is not having difficulty swallowing them.  No falls or injuries.  Safety precautions ongoing and call light within reach.  Will continue to monitor.

## 2017-01-19 NOTE — PLAN OF CARE
Problem: Occupational Therapy Goal  Goal: Occupational Therapy Goal  Goals to be met by 2/6/18  1. Mod A BSC transfer-Goal met 1/19/2017  2. Min A UBD  3. Maximize UE strength for mobility  4.Complete 30 minute session without refusal to attempt tasks   Outcome: Ongoing (interventions implemented as appropriate)  Pt. met BSC goal.  Postrerior leaning when standing; fears falling and very guarded and hesitant with shifting ws over feet.  Toileting mod assist; pt sis seated hygiene after she urinated; BSc mod assist with RW. SNF recommended.  Continue with OT POC.

## 2017-01-19 NOTE — PROGRESS NOTES
"Nephrology  Progress Note    Admit Date: 1/15/2017   LOS: 4 days     SUBJECTIVE:     Follow-up For:  Shortness of breath/KIRILL    Interval History:    More awake today.  Discussed with treatment team and family.  Anticipating skilled nursing facility today but will likely need assisted living versus nursing home soon.  Renal function remained stable.    Review of Systems:  Constitutional: No fever or chills  Respiratory: No cough or shortness of breath  Cardiovascular: No chest pain or palpitations  Gastrointestinal: No nausea or vomiting  Neurological: No confusion or weakness    OBJECTIVE:     Vital Signs Range (Last 24H):  Visit Vitals    BP (!) 106/56 (BP Location: Right arm, Patient Position: Lying, BP Method: Automatic)    Pulse 103    Temp 97.7 °F (36.5 °C) (Oral)    Resp 18    Ht 5' 2" (1.575 m)    Wt 54.4 kg (120 lb)    LMP  (LMP Unknown)    SpO2 (!) 94%    Breastfeeding No    BMI 21.95 kg/m2       Temp:  [97 °F (36.1 °C)-98 °F (36.7 °C)]   Pulse:  []   Resp:  [16-18]   BP: (100-124)/(56-72)   SpO2:  [91 %-99 %]     I & O (Last 24H):    Intake/Output Summary (Last 24 hours) at 01/19/17 1044  Last data filed at 01/18/17 2130   Gross per 24 hour   Intake              120 ml   Output                0 ml   Net              120 ml       Physical Exam:  General appearance: elderly/frail but NAD/confused this a.m.  Eyes:  Conjunctivae/corneas clear. PERRL.  Neck:  +JVD  Lungs: Normal respiratory effort,   Few atelectic sounds.   Heart: Irr/Regular rate and rhythm, S1, S2 normal, no murmur, rub or donis.  Abdomen: Soft, non-tender non-distended; bowel sounds normal; no masses,  no organomegaly  Extremities: No cyanosis or clubbing. No edema.    Skin: Skin color, texture, turgor normal. No rashes or lesions  Neurologic: Normal strength and tone. No focal numbness or weakness   Psych: Confused at times but oriented ×3    Laboratory Data:    Recent Labs  Lab 01/19/17  0426   WBC 8.07  8.07   RBC 3.52* "  3.52*   HGB 11.6*  11.6*   HCT 34.6*  34.6*   *  143*   MCV 98  98   MCH 33.0*  33.0*   MCHC 33.5  33.5       BMP:     Recent Labs  Lab 01/17/17  0438  01/19/17  0426   *  < > 166*  166*   *  < > 134*  134*   K 3.4*  < > 4.0  4.0     < > 105  105   CO2 17*  < > 17*  17*   BUN 80*  < > 75*  75*   CREATININE 2.8*  < > 2.3*  2.3*   CALCIUM 8.7  < > 8.8  8.8   MG 2.2  --   --    < > = values in this interval not displayed.  Lab Results   Component Value Date    CALCIUM 8.8 01/19/2017    CALCIUM 8.8 01/19/2017    PHOS 4.7 (H) 10/31/2016       BNP    Recent Labs  Lab 01/19/17  0426   BNP 3232*           Medications:  Medication list was reviewed and changes noted under Assessment/Plan.    Diagnostic Results:        ASSESSMENT/PLAN:     1. Resolved nonoliguric KIRILL on CKD IIIb-IV with baseline creatinine 2.2 likely secondary to decompensated heart failure? Vs over diuresis (N 17.9, N 18.4): No rhabdo. No Ace or ARB at this time. Last echo with NL EF/sysytolic fnc. Hold diuretics for now since renal fxn worsening but will likely need low-dose Lasix at discharge.  Follow trends closely. Renally dose meds, avoid nephrotoxins, and monitor I/O's closely.  2. Shortness of breath likely from decompensated heart failure (I 50.32): further recommendations from cardiology.  No complaints of shortness of breath ×72 hours.  3. Recent fall (W 19.XXX a): CT neg.   4. Chronic metabolic acidosis/RTA (E 87.2, N25.89): Continue oral bicarbonate and follow trends.   5. DM (E11.9):  januvia and ada diet  6. Disposition: Skilled nursing facility  7. DO NOT RESUSCITATE(Z66)  8. Altered mental status consistent with sundowners: Try to keep awake during the day.  Avoid any pain medicines or anxiolytics.        Cleared for discharge from renal standpoint  Follow-up as regularly scheduled

## 2017-01-19 NOTE — PLAN OF CARE
[facility]   Department of Hospital Medicine  [address] [place] [id number]  [phone] (phone)        Facility Transfer Orders                        01/19/2017    Sho Valencia    Admit to: SNF bed    Diagnoses:  Active Hospital Problems    Diagnosis  POA    *Shortness of breath [R06.02]  Yes     Priority: 3     DNR (do not resuscitate) [Z66]  No     Priority: 1 - High    Heart failure, diastolic, chronic [I50.32]  Yes     Priority: 1 - High     6/16/2016: Echo: Normal left ventricular size with hyperdynamic systolic function. Moderately dilated LA. Aortic valve sclerosis.      Chronic anticoagulation [Z79.01]  Not Applicable     Priority: 2      2010: Diagnosed with chronic atrial fibrillation.   [id number]= 8 ([id number]).  2010: Began warfarin.  2016: Began apixiban.      Acute renal failure [N17.9]  Yes     Priority: 2     Atrial fibrillation [I48.91]  Yes     Priority: 2      2010: Diagnosed with chronic atrial fibrillation.   [id number]= 8 ([id number]).  11/28/2016: Holter: Atrial fibrillation 87 () bpm. Rare VPCs.  2010: Began warfarin.  2016: Began apixiban.      Hypoxemia [R09.02]  Yes     Priority: 3      11/1/2016: Diagnosed. O2 Sat: Rest 92%. Ambulation 87%.      Hypertension [I10]  Yes     Priority: 3      2000: Diagnosed.      Chronic kidney disease, stage IV (severe) [N18.4]  Yes     Priority: 3      6/6/2016: BUN/crea 35/1.6. CrCl 30.      History of cerebrovascular accident [Z86.73]  Not Applicable     Priority: 3      2007: Diagnosed with CVA. Confusion.      Diabetes mellitus, type 2 [E11.9]  Yes     Priority: 3      2000: Diagnosed. Complications: Retinopathy & CKD. Medications: Insulin.        Resolved Hospital Problems    Diagnosis Date Resolved POA   No resolved problems to display.       Allergies:  Review of patient's allergies indicates:   Allergen Reactions    Bactrim [sulfamethoxazole-trimethoprim]     Sulfa  (sulfonamide antibiotics)        Vitals:         Every shift (Skilled Nursing patients)    Diet:      Supplement:  1 can every three times a day with meals                         Type:  House        Activity:   - Up in a chair each morning as tolerated   - Ambulate with assistance to bathroom      Nursing Precautions:  - Aspiration precautions:             - Total assistance with meals            -  Upright 90 degrees befor during and after meals        - Fall precautions      CONSULTS:    PT to evaluate and treat - five times a week     OT to evaluate and treat - five times a week     Nutrition to evaluate and recommend diet      LABS: CBC and BMP weekly or weekly labs per medical director of [facility]      DIABETES CARE:      Check blood sugar:     Fingerstick blood sugar a.m and p.m.      Report CBG < 60 or > 400 to physician.                                          Insulin Sliding Scale          Glucose  Novolog Insulin Subcutaneous        0 - 60   Orange juice or glucose tablet, hold insulin      No insulin   201-250  2 units   251-300  4 units   301-350  6 units   351-400  8 units   >400   10 units then call physician      Medications: Discontinue all previous medication orders, if any. See new list below.      Current Discharge Medication List      START taking these medications    Details   aspirin (ECOTRIN) 81 MG EC tablet Take 1 tablet (81 mg total) by mouth once daily.  Qty: 90 tablet, Refills: 3         CONTINUE these medications which have CHANGED    Details   furosemide (LASIX) 20 MG tablet Take 1 tablet (20 mg total) by mouth once daily.  Qty: 90 tablet, Refills: 3    Associated Diagnoses: Heart failure, diastolic, chronic      SITagliptan (JANUVIA) 25 MG Tab Take 1 tablet (25 mg total) by mouth once daily.  Qty: 30 tablet, Refills: 3         CONTINUE these medications which have NOT CHANGED    Details   allopurinol (ZYLOPRIM) 100 MG tablet Take 1 tablet (100 mg total) by mouth once  daily.  Qty: 90 tablet, Refills: 3      citalopram (CELEXA) 20 MG tablet Take 20 mg by mouth 2 (two) times daily.       divalproex (DEPAKOTE) 250 MG EC tablet 2 (two) times daily.       metoprolol succinate (TOPROL-XL) 100 MG 24 hr tablet Take 1 tablet (100 mg total) by mouth once daily.  Qty: 90 tablet, Refills: 3    Associated Diagnoses: Chronic atrial fibrillation; Essential hypertension      mirabegron 50 mg Tb24 Take 1 tablet (50 mg total) by mouth once daily.  Qty: 30 tablet, Refills: 11      ofloxacin (OCUFLOX) 0.3 % ophthalmic solution INSTILL 5 DROPS INTO LEFT EYE 3 TIMES A DAY FOR 10 DAYS  Refills: 0      sodium bicarbonate 325 MG tablet Take 325 mg by mouth 3 (three) times daily.   Refills: 3      vitamin D 1000 units Tab Take 185 mg by mouth once daily.         STOP taking these medications       ELIQUIS 2.5 mg Tab Comments:   Reason for Stopping:         cephALEXin (KEFLEX) 500 MG capsule Comments:   Reason for Stopping:                     _________________________________  Dr Nieves  01/19/2017

## 2017-01-19 NOTE — PT/OT/SLP PROGRESS
Occupational Therapy  Treatment    Sho Valencia   MRN: [id number]   Admitting Diagnosis: Shortness of breath    OT Date of Treatment: 17   OT Start Time: 1551  OT Stop Time: 1609  OT Total Time (min): 18 min    Billable Minutes:  Self Care/Home Management 18 and Total Time 18    General Precautions: Standard, fall, diabetic  Orthopedic Precautions: N/A  Braces: N/A    Do you have any cultural, spiritual, Adventist conflicts, given your current situation?: None    Subjective:  Communicated with nurse prior to session.      Pain Ratin/10              Pain Rating Post-Intervention: 0/10    Objective:  Patient found with: oxygen, telemetry     Functional Mobility:  Bed Mobility:  Scooting/Bridging: Minimum Assistance  Supine to Sit: Maximum Assistance  Sit to Supine: Stand by Assistance    Transfers:   Sit <> Stand Assistance: Moderate Assistance  Sit <> Stand Assistive Device: Rolling Walker  Bed <> Chair Technique: Stand Pivot  Bed <> Chair Transfer Assistance: Moderate Assistance (heavy posterior lean; fearful falling)  Bed <> Chair Assistive Device: Rolling Walker  Toilet Transfer Technique: Stand Pivot  Toilet Transfer Assistance: Moderate Assistance  Toilet Transfer Assistive Device: Rolling Walker, bedside commode    Functional Ambulation: NT    Activities of Daily Living:    Toileting Where Assessed: Bedside commode  Toileting Level of Assistance: Maximum assistance (pt. did periarea hygiene;assisted with clothes management)      Balance:   Static Sit: FAIR: Maintains without assist, but unable to take any challenges   Dynamic Sit: FAIR: Cannot move trunk without losing balance  Static Stand: POOR: Needs MODERATE assist to maintain  Dynamic stand: POOR: N/A    Therapeutic Activities and Exercises:  Bed mobility min assist supine>sit; sit>supine SBA. Toileting mod assist; pt sis seated hygiene after she urinated; BSc mod assist with RW. SNF recommended.       AM-PAC 6 CLICK ADL   How much help from  another person does this patient currently need?   1 = Unable, Total/Dependent Assistance  2 = A lot, Maximum/Moderate Assistance  3 = A little, Minimum/Contact Guard/Supervision  4 = None, Modified [place]/Independent    Putting on and taking off regular lower body clothing? : 2  Bathing (including washing, rinsing, drying)?: 2  Toileting, which includes using toilet, bedpan, or urinal? : 2  Putting on and taking off regular upper body clothing?: 2  Taking care of personal grooming such as brushing teeth?: 3  Eating meals?: 4  Total Score: 15     AM-PAC Raw Score CMS G-Code Modifier Level of Impairment Assistance   6 % Total / Unable   7 - 9 CM 80 - 100% Maximal Assist   10 - 14 CL 60 - 80% Moderate Assist   15 - 19 CK 40 - 60% Moderate Assist   20 - 22 CJ 20 - 40% Minimal Assist   23 CI 1-20% SBA / CGA   24 CH 0% Independent/ Mod I     Patient left supine with all lines intact, call button in reach, bed alarm on and daughter present    ASSESSMENT:  Sho Valencia is a 83 y.o. female with a medical diagnosis of Shortness of breath. Pt. met BSC goal.  Postrerior leaning when standing; fears falling and very guarded and hesitant with shifting ws over feet. High fall risk. SNF recommended.  Continue with OT POC.      Rehab identified problem list/impairments: Rehab identified problem list/impairments: weakness, impaired functional mobilty, impaired balance, impaired cognition, decreased coordination, gait instability, impaired self care skills, impaired endurance, decreased safety awareness    Rehab potential is good.    Activity tolerance: Fair    Discharge recommendations: Discharge Facility/Level Of Care Needs: nursing facility, skilled     Barriers to discharge: Barriers to Discharge: Inaccessible home environment, Decreased caregiver support    Equipment recommendations:  (TBD)     GOALS:   Occupational Therapy Goals        Problem: Occupational Therapy Goal    Goal Priority Disciplines Outcome  Interventions   Occupational Therapy Goal     OT, PT/OT Ongoing (interventions implemented as appropriate)    Description:  Goals to be met by 2/6/18  1. Mod A BSC transfer-Goal met 1/19/2017  2. Min A UBD  3. Maximize UE strength for mobility  4.Complete 30 minute session without refusal to attempt tasks               Plan:  Patient to be seen 6 x/week to address the above listed problems via self-care/home management, therapeutic activities, therapeutic exercises  Plan of Care expires:    Plan of Care reviewed with: patient, daughter         Jazmín Munoz, OT  01/19/2017

## 2017-01-19 NOTE — PLAN OF CARE
Goal: Plan of Care Review  Outcome: Ongoing (interventions implemented as appropriate)  Resting in a low lying bed on 2 L of oxygen via nc. At the onset of the shift the pt was confused but then lucid post 0800 VSS. Afebrile.  Telemetry is ongoing.  No c/o chest pain and she was asymptomatic. Repositions herself in bed independently. Incontinent to B and B. No falls or injuries. Safety precautions ongoing and call light within reach. Will continue to monitor.

## 2017-01-19 NOTE — PT/OT/SLP PROGRESS
"Physical Therapy  Treatment    Sho Valencia   MRN: [id number]   Admitting Diagnosis: Shortness of breath    PT Received On: 01/19/17  PT Start Time: 0900     PT Stop Time: 0924    PT Total Time (min): 24 min       Billable Minutes:  Therapeutic Activity 24    Treatment Type: Treatment  PT/PTA: PTA     PTA Visit Number: 1       General Precautions: Standard, fall, diabetic  Orthopedic Precautions: N/A   Braces: N/A    Do you have any cultural, spiritual, Jewish conflicts, given your current situation?: None specified    Subjective:  Communicated with nurse prior to session. Pt. Stated " I really wish that doctor would tell you not to come back"     Pain Rating:  (patient was unable to rate pain but complained of everything hurting with movement)     Objective:   Patient found with: peripheral IV, telemetry supine in bed     Functional Mobility:  Bed Mobility:   Scooting/Bridging: Moderate Assistance (secondary to patient resisting and fearful of falling)  Supine to Sit: Minimum Assistance (required increase time to perform and constant verbal and tactile cues for proper hand placement )  drawsheet to [facility] with total A of 2 people    Transfers:  Sit to stand from bed with Moderate A 2* posterior lean onto bed  SPT from bed to bedside chair with RW with Moderate A for walker management, balance and weight shifting  Sit to stand from bedside chair with RW with Min A vc's for hand placement   SPT from bedside chair to bed with RW with Min A for weight shifting and walker management; patient was agitated and insisted on going back to bed    Gait:     refused to ambulate    Balance:   Static Sit: FAIR+: Able to take MINIMAL challenges from all directions  Dynamic Sit: FAIR+: Maintains balance through MINIMAL excursions of active trunk motion  Static Stand: POOR: Needs MODERATE assist to maintain  Dynamic stand: POOR: N/A     AM-PAC 6 CLICK MOBILITY  How much help from another person does this patient currently need? "   1 = Unable, Total/Dependent Assistance  2 = A lot, Maximum/Moderate Assistance  3 = A little, Minimum/Contact Guard/Supervision  4 = None, Modified [place]/Independent    Turning over in bed (including adjusting bedclothes, sheets and blankets)?: 3  Sitting down on and standing up from a chair with arms (e.g., wheelchair, bedside commode, etc.): 2  Moving from lying on back to sitting on the side of the bed?: 3  Moving to and from a bed to a chair (including a wheelchair)?: 2  Need to walk in hospital room?: 3  Climbing 3-5 steps with a railing?: 1  Total Score: 14    AM-PAC Raw Score CMS G-Code Modifier Level of Impairment Assistance   6 % Total / Unable   7 - 9 CM 80 - 100% Maximal Assist   10 - 14 CL 60 - 80% Moderate Assist   15 - 19 CK 40 - 60% Moderate Assist   20 - 22 CJ 20 - 40% Minimal Assist   23 CI 1-20% SBA / CGA   24 CH 0% Independent/ Mod I     Patient left supine with all lines intact, call button in reach, bed alarm on and nurse notified.    Assessment:  Sho Valencia is a 83 y.o. female with a medical diagnosis of Shortness of breath patient was agitated, fearful of falling and complained of being too weak to participate. Patient will cont. To benefit from skilled PT services to improve strength, endurance and functional mobility.     Rehab identified problem list/impairments: Rehab identified problem list/impairments: weakness, impaired endurance, impaired cognition, impaired self care skills, impaired functional mobilty, gait instability, impaired balance, decreased coordination, decreased safety awareness    Rehab potential is fair.    Activity tolerance: Fair    Discharge recommendations: Discharge Facility/Level Of Care Needs: nursing facility, skilled     Barriers to discharge: Barriers to Discharge: Inaccessible home environment, Decreased caregiver support    Equipment recommendations: Equipment Needed After Discharge:  (TBD )     GOALS:   Physical Therapy Goals         Problem: Physical Therapy Goal    Goal Priority Disciplines Outcome Goal Variances Interventions   Physical Therapy Goal     PT/OT, PT Ongoing (interventions implemented as appropriate)     Description:  Goals to be met by: 17     Patient will increase functional independence with mobility by performin. Supine to sit with SBA.   2. Sit to supine with SBA.   3. Sit<>stand transfer with min A.   4. Gait x 25' feet with Min A using rolling walker.               PLAN:    Patient to be seen 6 x/week  to address the above listed problems via gait training, therapeutic activities, therapeutic exercises, neuromuscular re-education  Plan of Care expires: 17  Plan of Care reviewed with: patient, daughter         Lorri Kumar, PTA  2017

## 2017-01-19 NOTE — PLAN OF CARE
Problem: Patient Care Overview  Goal: Plan of Care Review  Outcome: Ongoing (interventions implemented as appropriate)  Patient received on 2.5L NC SAT 99%, Will continue to monitor.

## 2017-01-20 NOTE — PT/OT/SLP PROGRESS
Occupational Therapy      Sho Valencia  MRN: [id number]    Patient not seen today secondary to Unavailable (Comment) (patient sleeping; received morphine per family). Will follow-up next scheduled visit.  Patient's corinneStaci, informed OT would not be able to return on this date and verbalized understanding    MILTON Crane  1/20/2017

## 2017-01-20 NOTE — PLAN OF CARE
Problem: Patient Care Overview  Goal: Plan of Care Review  Outcome: Ongoing (interventions implemented as appropriate)  Pt remains free from falls and injury. Pt resting comfortably in bed with no complaints at this time. Family at bedside. Purposeful rounding performed. Bed in lowest position. Bed alarm set. Call light in reach. Will continue to monitor.

## 2017-01-20 NOTE — PLAN OF CARE
Problem: Patient Care Overview  Goal: Plan of Care Review  Outcome: Ongoing (interventions implemented as appropriate)  Patient received on 2L NC SAT 96%, IS unable to perform. Will continue to monitor.

## 2017-01-20 NOTE — PROGRESS NOTES
"Nephrology  Progress Note    Admit Date: 1/15/2017   LOS: 5 days     SUBJECTIVE:     Follow-up For:  Shortness of breath/KIRILL    Interval History:    Very drowsy this morning.  Denies chest pain or shortness of breath.  Awaiting N approval for skilled nursing facility.  No labs today.    Review of Systems:  Constitutional: No fever or chills  Respiratory: No cough or shortness of breath  Cardiovascular: No chest pain or palpitations  Gastrointestinal: No nausea or vomiting  Neurological: No confusion or weakness    OBJECTIVE:     Vital Signs Range (Last 24H):  Visit Vitals    BP (!) 118/58 (BP Location: Right arm, Patient Position: Lying, BP Method: Automatic)    Pulse 66    Temp 96.1 °F (35.6 °C) (Axillary)    Resp 19    Ht 5' 2" (1.575 m)    Wt 54.4 kg (120 lb)    LMP  (LMP Unknown)    SpO2 98%    Breastfeeding No    BMI 21.95 kg/m2       Temp:  [96.1 °F (35.6 °C)-98.4 °F (36.9 °C)]   Pulse:  []   Resp:  [18-19]   BP: ()/(58-67)   SpO2:  [79 %-98 %]     I & O (Last 24H):    Intake/Output Summary (Last 24 hours) at 01/20/17 0937  Last data filed at 01/19/17 2000   Gross per 24 hour   Intake              720 ml   Output               75 ml   Net              645 ml       Physical Exam:  General appearance: elderly/frail but NAD/confused this a.m.  Eyes:  Conjunctivae/corneas clear. PERRL.  Lungs: Normal respiratory effort,   Few atelectic sounds.   Heart: Irr/Regular rate and rhythm, S1, S2 normal, no murmur, rub or donis.  Abdomen: Soft, non-tender non-distended; bowel sounds normal; no masses,  no organomegaly  Extremities: No cyanosis or clubbing. No edema.    Skin: Skin color, texture, turgor normal. No rashes or lesions  Neurologic: Normal strength and tone. No focal numbness or weakness   Psych: Very drowsy this morning       BMP:     Recent Labs  Lab 01/17/17  0438  01/19/17  0426   *  < > 166*  166*   *  < > 134*  134*   K 3.4*  < > 4.0  4.0     < > 105  105 "   CO2 17*  < > 17*  17*   BUN 80*  < > 75*  75*   CREATININE 2.8*  < > 2.3*  2.3*   CALCIUM 8.7  < > 8.8  8.8   MG 2.2  --   --    < > = values in this interval not displayed.  Lab Results   Component Value Date    CALCIUM 8.8 01/19/2017    CALCIUM 8.8 01/19/2017    PHOS 4.7 (H) 10/31/2016       BNP    Recent Labs  Lab 01/19/17  0426   BNP 3232*           Medications:  Medication list was reviewed and changes noted under Assessment/Plan.    Diagnostic Results:        ASSESSMENT/PLAN:     1. Resolved nonoliguric KIRILL on CKD IIIb-IV with baseline creatinine 2.2 likely secondary to decompensated heart failure? Vs over diuresis (N 17.9, N 18.4): No rhabdo. No Ace or ARB at this time. Last echo with NL EF/sysytolic fnc. Hold diuretics for now since renal fxn worsening but will likely need low-dose Lasix at discharge.  Follow trends closely. Renally dose meds, avoid nephrotoxins, and monitor I/O's closely.  2. Shortness of breath likely from decompensated heart failure (I 50.32): further recommendations from cardiology.  No complaints of shortness of breath ×72+ hours.  3. Recent fall (W 19.XXX a): CT neg.   4. Chronic metabolic acidosis/RTA (E 87.2, N25.89): Continue oral bicarbonate and follow trends.   5. DM (E11.9):  januvia and ada diet  6. Disposition: Skilled nursing facility  7. DO NOT RESUSCITATE(Z66)  8. Altered mental status consistent with sundowners: Try to keep awake during the day.  Avoid any pain medicines or anxiolytics.        Cleared for discharge from renal standpoint  Follow-up as regularly scheduled

## 2017-01-20 NOTE — PROGRESS NOTES
Patient not seen unable to arouse patient due to IV morphine last night. Will attempt next treatment day. Attempted twice Lorri Kumar 1/20/2017

## 2017-01-20 NOTE — PROGRESS NOTES
" At 0100, patient heard to be crying out in distress and saying " I can't breathe."  Patient tachypneic and breathing through mouth, SpO2 is 84%.  Oxygen titrated up to 3 Liters and patient placed on continuous SpO2 monitor, breathing exercises performed.  When patient is calm, O2 sats stabilize above 90% but desat quickly when patient becomes anxious and begins to holler.  Patient repositioned for comfort and given PRN pain medication because she twice said that she hurts but unable to vocalize where.  Once patient began resting quietly, SpO2 returned to above 90% on 3 Liters O2, will continue to monitor and wean oxygen down.  "

## 2017-01-21 NOTE — PROGRESS NOTES
While turning and cleaning patient, patient's dugan rate accelerated to 200's and VTAC noted on telemetry monitor. Patient returned to head-elevated position and attempted to have patient perform valsalva maneuver but patient unresponsive and unable to follow commands. Heart rate noted to be in 20's, pulses thready, agonal breathing noted. Lay patient flat to ensure adequate perfusion. Dr. Nieves notified, orders given to consult Internal Medicine and proceed with their recommendations. Hospitalist contacted, will come up to see patient. Family notified to come up to hospital. House supervisor, charge nurse and other nursing staff at bedside.

## 2017-01-21 NOTE — PROGRESS NOTES
Per telemetry monitor, patient began exhibiting increasingly frequent PVC and Bigemeny, Trigemeny with heart rate in 50-60s.  Patient appeared in no distress and did not exhibit SOB or other signs.  Dr. Nieves notified and and new order placed to restart Metropolol 50 mg in am.  Will continue to monitor

## 2017-01-21 NOTE — SIGNIFICANT EVENT
Death Note    Called to see patient for unresponsiveness.  On exam the patient did not respond to verbal or physical stimuli.  Absent heart and breath sounds for >1m.  Absent peripheral pulses. Pupils are fixed and dilated, no corneal reflex.  Patient pronounced dead at 22:59.  Next of family notified by nursing staff.  Autopsy declined.  Coroners office notified.      Yusuf Damon MD

## 2017-01-21 NOTE — PROGRESS NOTES
Family members departed at 0032, retrieving all of patient's belongings (clothes, hearing aids, glasses, etc).  Dentures left in place with the patient.  Post-mortem care performed and body transferred in custody of security to [facility].  Family states that  Home will  remains, decline autopsy or further hospital investigation at this time.

## 2017-01-21 NOTE — PT/OT/SLP DISCHARGE
Occupational Therapy Discharge Summary    Sho Valencia  MRN: [id number]   Shortness of breath   Patient Discharged from acute Occupational Therapy on 17.  Please refer to prior OT note dated on 17 for functional status.     Assessment:   Goals partially met.  GOALS:   Occupational Therapy Goals        Problem: Occupational Therapy Goal    Goal Priority Disciplines Outcome Interventions   Occupational Therapy Goal     OT, PT/OT Ongoing (interventions implemented as appropriate)    Description:  Goals to be met by 18  1. Mod A C transfer-Goal met 2017  2. Min A UBD  3. Maximize UE strength for mobility  4.Complete 30 minute session without refusal to attempt tasks             Reasons for Discontinuation of Therapy Services  Pt .      Plan:  Patient Discharged to: transferred to [facility]  MILTON Garcia 2017

## 2017-01-21 NOTE — PLAN OF CARE
Discharge Planning:  Patient admitted on 1-15-17  Current dispo -SNF denial (currently the case remains in appeal with PH[employer]), DR Nieves completed the peer to peer this afternoon.    Patient and family are aware of the appeal status  Staci (dtr.) at the bedside  Staci continues to be upset about this denial  Supportives listening with Staci continued today  Expedited appeal number w PHN is [phone].       Discussed care plan with treatment team.  Discussed care plan with the attending, Dr Nieves and Fran Mustafa mgt to follow closely.

## 2017-01-21 NOTE — PROGRESS NOTES
Physical Therapy Discharge Summary    Sho Valencia  MRN: [id number]   Shortness of breath   Patient Discharged from acute Physical Therapy on 17.  Please refer to prior PT noted date on 17 for functional status.     Assessment:   Patient is   GOALS:   Physical Therapy Goals        Problem: Physical Therapy Goal    Goal Priority Disciplines Outcome Goal Variances Interventions   Physical Therapy Goal     PT/OT, PT Ongoing (interventions implemented as appropriate)     Description:  Goals to be met by: 17     Patient will increase functional independence with mobility by performin. Supine to sit with SBA.   2. Sit to supine with SBA.   3. Sit<>stand transfer with min A.   4. Gait x 25' feet with Min A using rolling walker.             Reasons for Discontinuation of Therapy Services    Patient is

## 2017-01-21 NOTE — PROGRESS NOTES
[facility]  Cardiology  Progress Note    Patient Name: Sho Valencia  MRN: [id number]  Admission Date: 1/15/2017  Hospital Length of Stay: 5 days  Code Status: DNR   Attending Physician: Ethan Nieves MD   Primary Care Physician: Yuri Villafuerte MD  Expected Discharge Date:   Principal Problem:Shortness of breath    Subjective:     Brief HPI:    Sho Valencia is a 83 y.o. female patient with hypertension and diabetes mellitus type 2. She has usually well compensated diastolic heart failure. She has chronic atrial fibrillation with a [id number] score of 8 ([id number]) having been on warfarin until 2016 when she was changed over to apixiban. 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 She presented with diastolic heart failure in 10/2016 and was diuresed with furosemide 40 mg ivp Q12. With that her dyspnea improved and the leg edema resolved. Her CKD had worsened and her DM was actually well controlled off both Januvia and insulin during her stay. Her O2 was low with ambulation after she completed diuresis why home O2 was arranged for. Her VRR was somewhat high on follow up and the dose of metoprolol was increased.     She recently had a fall at home when she hit the floor. The details of the fall are quite unclear. She now presents with increasing dyspnea to the ER. She was admitted.    Hospital Course:     CXR was essentially clear why diuretics were stopped.    Interval History:     Confused. Very sleepy all day after she received morphine last night.      Review of Systems   Constitutional: Positive for fatigue. Negative for chills and fever.   HENT: Positive for facial swelling.    Eyes: Negative for photophobia and visual disturbance.   Respiratory: Negative for chest tightness and shortness of breath.    Cardiovascular: Negative for chest pain, palpitations and leg swelling.   Gastrointestinal: Negative for abdominal distention,  abdominal pain, diarrhea and nausea.   Endocrine: Negative for cold intolerance and heat intolerance.   Genitourinary: Negative for dysuria, frequency and hematuria.   Musculoskeletal: Positive for arthralgias and gait problem. Negative for joint swelling and myalgias.   Skin: Negative for rash.   Neurological: Positive for weakness. Negative for light-headedness and headaches.   Hematological: Negative for adenopathy. Does not bruise/bleed easily.   Psychiatric/Behavioral: Positive for confusion. The patient is not nervous/anxious.      Objective:     Vital Signs (Most Recent):  Temp: 97.7 °F (36.5 °C) (01/20/17 1520)  Pulse: 63 (01/20/17 1800)  Resp: 18 (01/20/17 1520)  BP: (!) 109/59 (01/20/17 1520)  SpO2: 98 % (01/20/17 1520) Vital Signs (24h Range):  Temp:  [96.1 °F (35.6 °C)-98.4 °F (36.9 °C)] 97.7 °F (36.5 °C)  Pulse:  [] 63  Resp:  [18-19] 18  SpO2:  [79 %-98 %] 98 %  BP: ()/(58-66) 109/59     Weight: 54.4 kg (120 lb)  Body mass index is 21.95 kg/(m^2).    SpO2: 98 %  O2 Device (Oxygen Therapy): nasal cannula      Intake/Output Summary (Last 24 hours) at 01/20/17 1902  Last data filed at 01/19/17 2000   Gross per 24 hour   Intake              240 ml   Output                0 ml   Net              240 ml       Lines/Drains/Airways     Peripheral Intravenous Line                 Peripheral IV - Single Lumen 01/15/17 1228 Left Antecubital 2 days                Physical Exam   Constitutional: She appears well-developed.  Non-toxic appearance. She appears ill. No distress.   HENT:   Head: Normocephalic and atraumatic.   Nose: Nose normal.   Eyes: Right eye exhibits no discharge. Left eye exhibits no discharge. Right conjunctiva is not injected. Left conjunctiva is not injected. Right pupil is round. Left pupil is round. Pupils are equal.   Neck: No JVD present.   Cardiovascular: Normal rate, S1 normal and S2 normal.  An irregularly irregular rhythm present.   Murmur heard.   Systolic murmur is present     No diastolic murmur is present   Pulmonary/Chest: Effort normal and breath sounds normal.   Abdominal: Soft. Normal appearance. There is no tenderness.   Musculoskeletal:        Left ankle: She exhibits no swelling.   Lymphadenopathy:        Head (right side): No submandibular adenopathy present.        Head (left side): No submandibular adenopathy present.     She has no cervical adenopathy.   Neurological: She is alert. She is disoriented. No cranial nerve deficit.   Skin: Skin is dry. Ecchymosis (right face) noted. No rash noted.   Psychiatric: Cognition and memory are impaired.     Current Medications:     allopurinol  100 mg Oral Daily    aspirin  81 mg Oral Daily    citalopram  20 mg Oral BID    divalproex  250 mg Oral BID    famotidine  20 mg Oral BID    metoprolol succinate  100 mg Oral Daily    ofloxacin  5 drop Left Eye TID    senna-docusate 8.6-50 mg  1 tablet Oral BID    SITagliptan  25 mg Oral Daily    sodium bicarbonate  650 mg Oral BID    vitamin D  1,000 Units Oral Daily     Current Laboratory Results:    No results found for this or any previous visit (from the past 24 hour(s)).  Current Imaging Results:    Imaging Results         CT Cervical Spine Without Contrast (Final result) Result time:  01/15/17 13:42:21    Final result by Shakila Arango MD (01/15/17 13:42:21)    Impression:      No acute fracture.       Electronically signed by: SHAKILA ARANGO MD  Date:     01/15/17  Time:    13:42     Narrative:    CT CERVICAL SPINE WITHOUT CONTRAST  CLINICAL INDICATION:  fall.    TECHNIQUE: 2mm axial images were acquired of the cervical spine without the administration of contrast. Coronal and sagittal reconstructions were performed.     COMPARISON: None      FINDINGS:    Spinal alignment is within normal limits. The vertebral body heights are adequately maintained. The intervertebral disk spaces are adequately maintained. There is no evidence of lytic or blastic lesions. There is no  evidence of fracture or dislocation.      There is mild degenerative change of the lower cervical spine.    The remainder of the visualized osseous structures and soft tissues appear unremarkable.            CT Maxillofacial Without Contrast (Final result) Result time:  01/15/17 13:20:17    Final result by Shakila Arango MD (01/15/17 13:20:17)    Impression:     No acute fracture.      Electronically signed by: SHAKILA ARANGO MD  Date:     01/15/17  Time:    13:20     Narrative:      EXAM: Maxillofacial CT without contrast    CLINICAL INDICATION:  fall.    TECHNIQUE:  0.625  mm images were acquired through the paranasal sinuses from just above the frontal sinus down to just below the mandible without contrast were acquired.    COMPARISON:None.    FINDINGS: The frontal sinuses, frontal ethmoid recesses, ethmoid sinuses, sphenoid sinuses, and maxillary sinuses are clear.  Ostiomeatal complexes are patent bilaterally.  The nasal bones are intact and the nasal septum is midline.  The orbits and orbital contents have normal appearance.    There is no evidence of acute fracture.            CT Head Without Contrast (Final result) Result time:  01/15/17 13:18:26    Final result by Shakila Arango MD (01/15/17 13:18:26)    Impression:     No significant change from previous exam with evidence of large remote right temporoparietal infarct and small left cerebellar remote infarct.      Electronically signed by: SHAKILA ARANGO MD  Date:     01/15/17  Time:    13:18     Narrative:      EXAM: Non-contrast head CT    CLINICAL INDICATION:  head injury.    TECHNIQUE: Routine cranial CT was acquired in the axial plane without contrast and coronal and sagittal reconstructions were performed.    COMPARISON:Prior from 12/16/13    FINDINGS: The brain parenchyma is of normal attenuation with no evidence of acute hemorrhage, mass lesion or new major vascular distribution infarct.  There is a large stable area of  encephalomalacia in the right parietal temporal region and a smaller area in the left cerebellum consistent with remote infarcts. There are no extra-axial masses or fluid collections.  The ventricular system is of normal size for age and midline.     The visualized paranasal sinuses and mastoid air cells are clear.     There is no evidence of acute fracture.            X-Ray Chest 1 View (Final result) Result time:  01/15/17 12:48:06    Final result by Shakila Arango MD (01/15/17 12:48:06)    Impression:    Clear lungs.      Electronically signed by: SHAKILA ARANGO MD  Date:     01/15/17  Time:    12:48     Narrative:    EXAM:  AP CHEST RADIOGRAPH.     CLINICAL INDICATION:  Shortness of breath.    TECHNIQUE: Single AP view of the chest was obtained.     COMPARISON: Prior from 10/31/16    FINDINGS: The mediastinal structures are midline.  The cardiac silhouette is prominent but difficult to evaluate due to AP technique.  Is atherosclerotic calcification of the aortic arch. The lungs appear grossly clear.  No osseous abnormalities are identified.              Assessment and Plan:     Problem List:    Active Diagnoses:    Diagnosis Date Noted POA    PRINCIPAL PROBLEM:  Shortness of breath [R06.02] 06/05/2016 Yes    DNR (do not resuscitate) [Z66] 01/18/2017 No    Heart failure, diastolic, chronic [I50.32] 12/16/2013 Yes    Chronic anticoagulation [Z79.01] 06/05/2016 Not Applicable    Acute renal failure [N17.9] 06/05/2013 Yes    Atrial fibrillation [I48.91] 04/03/2013 Yes    Hypoxemia [R09.02] 11/02/2016 Yes    Hypertension [I10] 12/16/2013 Yes    Chronic kidney disease, stage IV (severe) [N18.4] 12/16/2013 Yes    History of cerebrovascular accident [Z86.73] 07/10/2013 Not Applicable    Diabetes mellitus, type 2 [E11.9] 04/03/2013 Yes      Problems Resolved During this Admission:    Diagnosis Date Noted Date Resolved POA     Assessment and Plan:    1. Heart Failure, Diastolic, Chronic               6/16/2016: Echo: Normal left ventricular size with hyperdynamic systolic function. Moderately dilated LA. Aortic valve sclerosis.              Usually on furosemide 40 mg Q24.              Overall well compensated.   Plan to resume furosemide at a dose of 20 mg Q24 on discharge.     2. Atrial Fibrillation              2010: Diagnosed with chronic atrial fibrillation.                [id number]= 8 ([id number]).              11/28/2016: Holter: Atrial fibrillation 87 () bpm. Rare VPCs.                          On metoprolol 100 mg Q24.              Rate appears well controlled.      3. Chronic Anticoagulation              2010: Diagnosed with chronic atrial fibrillation.                [id number]= 8 ([id number]).              2010: Began warfarin.              2016: Began apixiban.              Was on apixiban 2.5 mg Q12.              With worsening CKD, fall and worsening fragility do mnot believe benefit with anticoagulation outweigh risk even though IAW6PG3XCAx is quite high.              Stopped apixiban.              On aspirin 81 mg Q24.     4. History of Cerebrovascular Accident            2007: Diagnosed with CVA. Confusion.     5. Hypertension            2000: Diagnosed.            Was on metoprolol 100 mg Q24 and furosemide 40 mg Q24.            Appears well controlled.     6. Diabetes Mellitus, Type 2            2000: Diagnosed. Complications: Retinopathy & CKD. Medications: Oral agent.            On Januvia 25 mg Q24.            Fran Chaidez.     7. Chronic Kidney Disease, Stage 4            6/6/2016: BUN/crea 35/1.6. CrCl 30.            10/29/2016: BUN/crea 56/2.0. CrCl 23.             Now further worsening.             Dr. Gerber Jorge Jr..             Dr. Celine Contreras.     8. Chronic Obstructive Pulmonary Disease             Hypoxemia.             Resting 92% down to 87% with activities              On home O2.     9. Weakness             Rolling walker and wheelchair.     10. Primary Care              Dr. Yuri Villafuerte.    11. Disposition   1/19/2017: Discussed with family. To SNF but long term appears to need NH unless 24 hour sitters can be arranged.   PHN denying.   Was in telephone conversaition with outside reviewer today.       VTE Risk Mitigation         Ordered     Medium Risk of VTE  Once      01/15/17 1740     Place sequential compression device  Until discontinued      01/15/17 1740          Anticipated Disposition: Home or Self Care    Discharge Needs: None unusual planned.    Ethan Nieves MD  Cardiology  [facility]

## 2017-01-21 NOTE — PROGRESS NOTES
Family at bedside.   and house supervisor available to offer support/counseling to family members.

## 2017-01-21 NOTE — PLAN OF CARE
01/20/17 1843   Discharge Reassessment   Assessment Type Discharge Planning Reassessment   Can the patient answer the patient profile reliably? No, cognitively impaired   How does the patient rate their overall health at the present time? Fair   Describe the patient's ability to walk at the present time. Major restrictions/daily assistance from another person   How often would a person be available to care for the patient? Occasionally   Discharge Plan A Skilled Nursing Facility   Discharge Plan B Home Health   Change in patient condition or support system Yes   Explained to the the patient/caregiver why the discharge planned changed: Yes   Involved the patient/caregiver in establishing a new discharge plan: Yes

## 2017-01-21 NOTE — DISCHARGE SUMMARY
[facility]  Cardiology  Discharge Summary      Patient Name: Sho Valencia  MRN: [id number]  Admission Date: 1/15/2017  Hospital Length of Stay: 6 days  Discharge Date and Time: 2017 - .  Attending Physician: No att. providers found  Discharging Provider: Ethan Nieves MD  Primary Care Physician: Yuri Villafuerte MD    HPI:     Sho Valencia is a 83 y.o. female patient with hypertension and diabetes mellitus type 2. She has usually well compensated diastolic heart failure. She has chronic atrial fibrillation with a GUD4LW5GGGw score of 8 ([id number]) having been on warfarin until  when she was changed over to apixiban. 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 She presented with diastolic heart failure in 10/2016 and was diuresed with furosemide 40 mg ivp Q12. With that her dyspnea improved and the leg edema resolved. Her CKD had worsened and her DM was actually well controlled off both Januvia and insulin during her stay. Her O2 was low with ambulation after she completed diuresis why home O2 was arranged for. Her VRR was somewhat high on follow up and the dose of metoprolol was increased.       * No surgery found *     Indwelling Lines/Drains at time of discharge:  Lines/Drains/Airways          No matching active lines, drains, or airways          Hospital Course:    She recently had a fall at home when she hit the floor. The details of the fall are quite unclear. She presented with increasing dyspnea to the ER. She was admitted. A CXR was essentially clear why diuretics were stopped as she appeared hypovolemic. She was quite weak and confused during her stay.    The family did not want any heroic measures as per the patient wishes. It was tried to arrange for her to go to SNF but PHN denied that. On 2017 she became weaker. When the nurses came in to check on the night of 2017 she suddenly stopped  breathing. She was pronuced dead by Dr. Yusuf Aiken on 22:59. The family was notified and later vistited the hospital.     Consults:     Consults         Status Ordering Provider     Inpatient consult to Nephrology  Once     Provider:  Celine Contreras MD    Completed ELLIE FUNG     Inpatient consult to Social Work/Case Management  Once     Provider:  (Not yet assigned)    ROZINA Wen          Significant Diagnostic Studies:   Pending Diagnostic Studies:     None          Final Active Diagnoses:    Diagnosis Date Noted POA    PRINCIPAL PROBLEM:  Shortness of breath [R06.02] 2016 Yes    DNR (do not resuscitate) [Z66] 2017 No    Heart failure, diastolic, chronic [I50.32] 2013 Yes    Chronic anticoagulation [Z79.01] 2016 Not Applicable    Acute renal failure [N17.9] 2013 Yes    Atrial fibrillation [I48.91] 2013 Yes    Hypoxemia [R09.02] 2016 Yes    Hypertension [I10] 2013 Yes    Chronic kidney disease, stage IV (severe) [N18.4] 2013 Yes    History of cerebrovascular accident [Z86.73] 07/10/2013 Not Applicable    Diabetes mellitus, type 2 [E11.9] 2013 Yes      Problems Resolved During this Admission:    Diagnosis Date Noted Date Resolved POA       Discharged Condition:     Disposition: Home or Self Care    Follow Up:  Follow-up Information     Follow up with Yuri Villafuerte MD. Schedule an appointment as soon as possible for a visit in 2 weeks.    Specialty:  Internal Medicine    Contact information:    [address] [id number] [phone]          Follow up with Ethan Nieves MD. Schedule an appointment as soon as possible for a visit in 3 weeks.    Specialty:  Cardiology    Contact information:    [address] [facility] [address] [phone]          Follow up with [facility].    Specialties:  Nursing Home Agency, SNF Agency    Why:  SNF-    Contact information:    [address]  [facility] [address]  [phone]          Follow up with Ethan Nieves MD.    Specialty:  Cardiology    Contact information:    [address]  [facility] [id number] [phone]          Patient Instructions:     COMMODE FOR HOME USE   Order Specific Question Answer Comments   Type: Standard    Height: 5' 2" (1.575 m)    Weight: 54.4 kg (120 lb)    Does patient have medical equipment at home? walker, rolling    Does patient have medical equipment at home? wheelchair    Length of need (1-99 months): 99      Diet general       Medications:  None (patient  at medical facility)    Time spent on the discharge of patient: 60 minutes    Ethan Nieves MD  Cardiology  [facility]  "

## 2017-02-02 NOTE — PHYSICIAN QUERY
"PT Name: Sho Valencia  MR #: [id number]    Physician Query Form -Respiratory Condition Clarification    Reviewer  Ext [email or web address]    This form is a permanent document in the medical record.    Query Date: February 2, 2017    By submitting this query, we are merely seeking further clarification of documentation. Please utilize your independent clinical judgment when addressing the question(s) below.  (The Medical record reflects the following:)   Indicators   Supporting Clinical Findings Location in Medical Record   X "Wheezing", "Productive cough", "SOB", "RICCI", "Use of accessory muscles" documented Dyspnea/rhonchi/rales ED MD/H&P/PN    Chest X-Ray =      X "Hypoxia" documented hypoxemia H&P/PN    Respiratory Distress or Failure documented     X RR=        PaO2=      PaCO2=      O2 sat= O2 sats 79-95 PN 1/17-1/21/flowsheets    Treatment:      BiPAP/Intubation     X Supplemental O2/Home O2: 2L nc/"on home 02" PN 1/20   DC sum 1/21   X Oxygen dependence     X Other: Acidosis/COPD/decompensated diastolic heart failure H&P/PN 1/16-1/20   Provider, please specify diagnosis or diagnoses associated with above clinical findings.    [  ] Acute Respiratory Failure  [  ] Chronic Respiratory Failure  [x  ] Acute on Chronic Respiratory Failure  [  ] Other Respiratory Diagnosis (Specify)        [  ] Clinically Undetermined    Please document in your progress notes daily for the duration of treatment, until resolved, and include in your discharge summary.                                                                                      "